# Patient Record
Sex: MALE | Employment: OTHER | URBAN - METROPOLITAN AREA
[De-identification: names, ages, dates, MRNs, and addresses within clinical notes are randomized per-mention and may not be internally consistent; named-entity substitution may affect disease eponyms.]

---

## 2017-01-03 ENCOUNTER — TELEPHONE (OUTPATIENT)
Dept: SURGERY | Age: 67
End: 2017-01-03

## 2017-01-05 DIAGNOSIS — M16.7 OTHER SECONDARY OSTEOARTHRITIS OF RIGHT HIP: Primary | ICD-10-CM

## 2017-01-05 NOTE — TELEPHONE ENCOUNTER
Returned call to wife of patient, informed her of  appointment on 3/24/17 for 9 AM. With someone in Dr Denise Zheng group for osteoarthritis .

## 2017-03-20 ENCOUNTER — TELEPHONE (OUTPATIENT)
Dept: RHEUMATOLOGY | Age: 67
End: 2017-03-20

## 2017-03-24 ENCOUNTER — TELEPHONE (OUTPATIENT)
Dept: RHEUMATOLOGY | Age: 67
End: 2017-03-24

## 2017-03-27 ENCOUNTER — OFFICE VISIT (OUTPATIENT)
Dept: RHEUMATOLOGY | Age: 67
End: 2017-03-27

## 2017-03-27 VITALS
HEART RATE: 65 BPM | DIASTOLIC BLOOD PRESSURE: 73 MMHG | WEIGHT: 252 LBS | RESPIRATION RATE: 18 BRPM | OXYGEN SATURATION: 95 % | HEIGHT: 69 IN | SYSTOLIC BLOOD PRESSURE: 130 MMHG | TEMPERATURE: 96.2 F | BODY MASS INDEX: 37.33 KG/M2

## 2017-03-27 DIAGNOSIS — M17.0 PRIMARY OSTEOARTHRITIS OF BOTH KNEES: ICD-10-CM

## 2017-03-27 DIAGNOSIS — M19.042 PRIMARY OSTEOARTHRITIS OF BOTH HANDS: ICD-10-CM

## 2017-03-27 DIAGNOSIS — M19.041 PRIMARY OSTEOARTHRITIS OF BOTH HANDS: ICD-10-CM

## 2017-03-27 DIAGNOSIS — E66.9 OBESITY (BMI 30-39.9): ICD-10-CM

## 2017-03-27 DIAGNOSIS — M16.11 PRIMARY OSTEOARTHRITIS OF RIGHT HIP: Primary | ICD-10-CM

## 2017-03-27 DIAGNOSIS — M48.10 DISH (DIFFUSE IDIOPATHIC SKELETAL HYPEROSTOSIS): ICD-10-CM

## 2017-03-27 RX ORDER — DEXTROMETHORPHAN HYDROBROMIDE, GUAIFENESIN 5; 100 MG/5ML; MG/5ML
1300 LIQUID ORAL
COMMUNITY

## 2017-03-27 RX ORDER — BISMUTH SUBSALICYLATE 262 MG
1 TABLET,CHEWABLE ORAL DAILY
COMMUNITY

## 2017-03-27 NOTE — MR AVS SNAPSHOT
Visit Information Date & Time Provider Department Dept. Phone Encounter #  
 3/27/2017  9:00 AM Noreen Alves MD [facility] [id number] Upcoming Health Maintenance Date Due Hepatitis C Screening 1950 DTaP/Tdap/Td series (1 - Tdap) 10/13/1970 FOBT Q 1 YEAR AGE 50-75 10/13/1999 ZOSTER VACCINE AGE 60> 10/13/2009 GLAUCOMA SCREENING Q2Y 10/13/2014 Pneumococcal 65+ Low/Medium Risk (1 of 2 - PCV13) 10/13/2014 MEDICARE YEARLY EXAM 10/13/2014 INFLUENZA AGE 9 TO ADULT 8/1/2016 Allergies as of 3/27/2017  Review Complete On: 3/27/2017 By: Noreen Alves MD  
 No Known Allergies Current Immunizations  Never Reviewed No immunizations on file. Not reviewed this visit You Were Diagnosed With   
  
 Codes Comments Primary osteoarthritis of right hip    -  Primary ICD-10-CM: M16.11 
ICD-9-CM: 715.15 Primary osteoarthritis of both knees     ICD-10-CM: M17.0 ICD-9-CM: 715.16 Primary osteoarthritis of both hands     ICD-10-CM: M19.041, I55.698 ICD-9-CM: 715.14 DISH (diffuse idiopathic skeletal hyperostosis)     ICD-10-CM: M48.10 ICD-9-CM: 721.6 Obesity (BMI 30-39. 9)     ICD-10-CM: E66.9 ICD-9-CM: 278.00 Vitals BP Pulse Temp Resp Height(growth percentile) Weight(growth percentile) 130/73 (BP 1 Location: Right arm, BP Patient Position: Sitting) 65 96.2 °F (35.7 °C) 18 5' 9\" (1.753 m) 252 lb (114.3 kg) SpO2 BMI Smoking Status 95% 37.21 kg/m2 Former Smoker BMI and BSA Data Body Mass Index Body Surface Area  
 37.21 kg/m 2 2.36 m 2 Preferred Pharmacy Pharmacy Name Phone [id number] [address]  [phone] Your Updated Medication List  
  
   
This list is accurate as of: 3/27/17  9:32 AM.  Always use your most recent med list.  
  
  
  
  
 ANDROGEL 20.25 mg/1.25 gram (1.62 %) gel Generic drug:  testosterone Apply  to affected area daily. aspirin, buffered 81 mg Tab Take  by mouth. ATROVENT HFA 17 mcg/actuation inhaler Generic drug:  ipratropium Take 1 Puff by inhalation. furosemide 80 mg tablet Commonly known as:  LASIX Take  by mouth daily. METOPROLOL TARTRATE PO Take  by mouth.  
  
 multivitamin tablet Commonly known as:  ONE A DAY Take 1 Tab by mouth daily. Omega-3 Fatty Acids 300 mg Cap Take  by mouth.  
  
 potassium chloride SR 20 mEq tablet Commonly known as:  K-TAB Take  by mouth. TYLENOL ARTHRITIS PAIN 650 mg CR tablet Generic drug:  acetaminophen Take 650 mg by mouth every six (6) hours as needed for Pain.  
  
 warfarin 6 mg tablet Commonly known as:  COUMADIN Take 4 mg by mouth daily. Pt takes 4 mg on Monday and 6mg on TU, Wed, Th, Fri, Sat, Sun. We Performed the Following REFERRAL TO PHYSICAL THERAPY [LDL09 Custom] Comments:  
 Evaluate and treat right hip osteoarthritis and DISH Referral Information Referral ID Referred By Referred To  
  
 [id number] Sofia Troncoso Not Available Visits Status Start Date End Date 1 New Request 3/27/17 3/27/18 If your referral has a status of pending review or denied, additional information will be sent to support the outcome of this decision. Patient Instructions I gave you a referral to physical therapy. HIP OSTEOARTHRITIS. Osteoarthritis is also known as \"wear and tear arthritis,\" mechanical arthritis, or non-inflammatory arthritis. There are hereditary and vocational components and post-traumatic joint injuries may also predispose to it. It is not Rheumatoid Arthritis, however, patients with Rheumatoid Arthritis may also have osteoarthritis.   
 
I recommend maintaining a healthy weight to slow the progression of osteoarthritis in addition to following the [employer] of Rheumatology Osteoarthritis Treatment Guidelines (Girish BUSH, et al. Arthritis Care Res [facility]). 2012;64(4):465):  
 
(1) non-pharmacologic modalities such as aerobic, aquatic, and/or resistance exercises as well as weight loss for overweight patients   
 
(2) pharmacologic modalities, such as acetaminophen as first line (3000 mg maximum per day). (3) tramadol, as third line  
 
(4) intra-articular corticosteroid injections, as fourth line 
 
(5) knee joint replacement surgery MY RECOMMENDATIONS 
 
WEIGHT LOSS 
 
1) I recommend weight loss because every 1 lb of extra weight is approximately 5 lbs of extra weight on the knees. 2) Please count your daily caloric intake and try not to exceed 9405-9206 calories. EXERCISE 
 
1) You should perform at least 30 minutes of physical exercise per day, such as walking, climbing stairs, or swimming. If you have access to a pool, I recommend walking in the pool for at least 30 minutes every day. Introducing [facility]! Kimberlyn Brody introduces Gather patient portal. Now you can access parts of your medical record, email your doctor's office, and request medication refills online. 1. In your internet browser, go to [email or web address] 2. Click on the First Time User? Click Here link in the Sign In box. You will see the New Member Sign Up page. 3. Enter your Gather Access Code exactly as it appears below. You will not need to use this code after youve completed the sign-up process. If you do not sign up before the expiration date, you must request a new code. · Gather Access Code: [email or web address] Expires: 6/25/2017  9:30 AM 
 
4. Enter the last four digits of your Social Security Number (xxxx) and Date of Birth (mm/dd/yyyy) as indicated and click Submit. You will be taken to the next sign-up page. 5. Create a Gather ID.  This will be your Gather login ID and cannot be changed, so think of one that is secure and easy to remember. 6. Create a [employer] password. You can change your password at any time. 7. Enter your Password Reset Question and Answer. This can be used at a later time if you forget your password. 8. Enter your e-mail address. You will receive e-mail notification when new information is available in [address] 9. Click Sign Up. You can now view and download portions of your medical record. 10. Click the Download Summary menu link to download a portable copy of your medical information. If you have questions, please visit the Frequently Asked Questions section of the [employer] website. Remember, [employer] is NOT to be used for urgent needs. For medical emergencies, dial 911. Now available from your iPhone and Android! Please provide this summary of care documentation to your next provider. Your primary care clinician is listed as [address]. If you have any questions after today's visit, please call [phone].

## 2017-03-27 NOTE — PROGRESS NOTES
REASON FOR VISIT    This is the initial evaluation for Mr. Licona a 78 y.o.  male for question of an inflammatory arthritis. The patient is referred to the [facility] and [address] [place] [address] at the request of Dr. Wale Foss. HISTORY OF PRESENT ILLNESS      In 7/2016, while walking on the treadmill, he developed pain in his groin. It feels like a sore lump but when he palpates, there is no lump. It worse going and up stairs or standing for a long time. It waxes and wanes. It had tried Tylenol Arthritis with relief. He does not take NSAIDs due to his heart disease and is on warfarin. It improves with exercise. It tends to be worse by the end of the day. At night, when he rolls over, he develops pain on her left flank. In 11/13/2016, ultrasound scrotum RIGHT TESTICLE: The right testicle is normal in size and echotexture with normal color-flow. The right testis measures 1.6 x 2.5 x 3.0 cm. RIGHT EPIDIDYMIS: The right epididymis shows a 1.6 x 2.0 x 2.2 cm nontender cyst with fine echoes possible spermatocele. Imaging of the right inguinal area without and with the Valsalva maneuver demonstrates no appreciable hernia, adenopathy or fluid collection. LEFT TESTICLE: The left testicle is normal in size and echotexture with normal color-flow. The left testis measures 1.9 x 2.6 x 3.0 cm. LEFT EPIDIDYMIS: The left epididymis shows 2 cysts measuring 0.9 x 0.9 x 1.0 and 0.6 x 0.7 x 0.9 cm. Imaging over the left inguinal area demonstrate no abnormality. In 12/19/2016, CT Abdomen and pelvis with contrast showed there are two stable 5 mm nodules in the lateral aspect of the right middle lobe. Mild pleural thickening is noted in both posterior costophrenic angles, also stable. There are no focal abnormalities within the spleen, pancreas, adrenal glands. Nonobstructing left renal stones measure no greater than 4 mm in size.  Within the left hepatic lobe, there is an 8.4 cm hypodense structure that has a Hounsfield unit measurement of 5, compatible with a simple cyst. Arising from the posterior medial aspect of the left hepatic lobe is a stable 2.8 cm cyst with a Hounsfield unit measurement of 7.5. The aorta tapers without aneurysm. There is no retroperitoneal adenopathy or mass. There is no ascites or free intraperitoneal air. The bowel is normal. The appendix is normal. There is no pelvic mass or adenopathy. A few calcifications are noted in the right prostate. There are multiple corticated cysts in the right acetabulum. Mild DISH is seen. I personally reviewed the images of this study with the patient and his wife. He is a former smoker. Today, he denies pain. He notices his right leg is rotated outside. He is taking Tylenol arthritis twice daily. He has noticed loss of flexibility and diffuse morning stiffness lasting 30 minutes. REVIEW OF SYSTEMS    A 15 point review of systems was performed and summarized below. The questionnaire was reviewed with the patient and scanned into the patient's medical record.     General: endorses recent 6 lbs weight gain, denies recent weight loss, fatigue, weakness, fever, night sweats  Musculoskeletal: endorses morning stiffness (lasting 30 minutes), joint pain, joint swelling, denies muscle weakness  Ears: endorses ringing in ears, loss of hearing  Eyes: denies pain, redness, loss of vision, double vision, blurred vision, dryness, foreign body sensation  Mouth: denies sore tongue, oral ulcers, bleeding gums, loss of taste, dryness, increased dental caries  Nose: denies nosebleeds, loss of smell, nasal ulcers  Throat: denies frequent sore throats, hoarseness, difficulty in swallowing, pain in jaw while chewing  Neck: denies swollen glands, tender glands  Cardiopulmonary: endorses irregular heart beat, swollen legs or feet, denies pain in chest, sudden changes in heart beat, shortness of breath, difficulty breathing at night, cough, coughing of blood, wheezing  Gastrointestinal: denies nausea, heartburn, stomach pain relieved by food, vomiting of blood/\"coffee grounds\", jaundice, increasing constipation, persistent diarrhea, blood in stools, black stools  Genitourinary: denies difficult urination, pain or burning on urination, blood in urine, cloudy urine, pus in urine, genital discharge, frequent urination, getting up at night to pass urine, rash/ulcers, sexual difficulties, prostate trouble  Hematologic: denies anemia, bleeding tendency, blood clots  Skin: endorses easy bruising, redness, nodules/bumps, hair loss, denies rash, hives, sun sensitive, skin tightness, color changes of hands or feet in the cold (Raynaud's)  Neurologic: denies headaches, dizziness, numbness or tingling in hands/feet, memory loss, muscle weakness, fainting of loss of consciousness  Psychiatric: denies depression, excessive worries  Sleep: endorses obstructive sleep apnea on CPAP, denies poor sleep (6 hours), denies snoring, daytime somnolence, difficulty falling asleep, difficulty staying asleep     PAST MEDICAL HISTORY    He has a past medical history of CAD (coronary artery disease); CAD (coronary artery disease); Congestive heart failure ([facility] [address] Low testosterone; and Sleep apnea. FAMILY HISTORY    His family history includes Breast Cancer in his mother; Diabetes in his brother and mother; Heart Disease in his father. SOCIAL HISTORY    He reports that he has quit smoking. He does not have any smokeless tobacco history on file. He reports that he does not drink alcohol. HEALTH MAINTENANCE    Immunizations    There is no immunization history on file for this patient. Age Appropriate Cancer Screening    Colonoscopy: 2009    MEDICATIONS    Current Outpatient Prescriptions   Medication Sig Dispense Refill    Omega-3 Fatty Acids 300 mg cap Take  by mouth.  multivitamin (ONE A DAY) tablet Take 1 Tab by mouth daily.       acetaminophen (TYLENOL ARTHRITIS PAIN) 650 mg CR tablet Take 650 mg by mouth every six (6) hours as needed for Pain.  ipratropium (ATROVENT HFA) 17 mcg/actuation inhaler Take 1 Puff by inhalation.  warfarin (COUMADIN) 6 mg tablet Take 4 mg by mouth daily. Pt takes 4 mg on Monday and 6mg on TU, Wed, Th, Fri, Sat, Sun.      Aspirin, Buffered 81 mg tab Take  by mouth.  testosterone (ANDROGEL) 20.25 mg/1.25 gram (1.62 %) gel Apply  to affected area daily.  METOPROLOL TARTRATE PO Take  by mouth.  furosemide (LASIX) 80 mg tablet Take  by mouth daily.  potassium chloride 20 mEq TbER Take  by mouth. ALLERGIES    No Known Allergies    PHYSICAL EXAMINATION    Visit Vitals    /73 (BP 1 Location: Right arm, BP Patient Position: Sitting)    Pulse 65    Temp 96.2 °F (35.7 °C)    Resp 18    Ht 5' 9\" (1.753 m)    Wt 252 lb (114.3 kg)    SpO2 95%    BMI 37.21 kg/m2     Body mass index is 37.21 kg/(m^2). General: Patient is alert, oriented x 3, not in acute distress, wife at bedside    HEENT:   Conjunctiva are not injected and appear moist, oral mucous membranes are moist, there are no ulcers present, there is no alopecia, neck is supple, there is no lymphadenopathy. Salivary glands are normal    Cardiovascular:  Heart is regular rate and rhythm, no murmurs. Chest:  Lungs are clear to auscultation bilaterally. Abdomen:  Obese. Extremities:  Free of clubbing, cyanosis, edema, extremities well perfused. Neurological exam:  No focal sensory deficits, muscle strength is full in upper and lower extremities. Skin exam:  There are no rashes, no tophi, no psoriasis, no active Raynaud's, no livedo reticularis, no periungual erythema. Musculoskeletal exam:  A comprehensive musculoskeletal exam was performed for all joints of each upper and lower extremity and assessed for swelling, tenderness and range of motion. Pertinent results are documented as below:    Bilateral Sybil and Heberden nodes. Bilateral knee crepitus without effusion.   Right hip external rotation without limitation in ROM  Internal rotation of right hip causes trochanter bursa pain  No synovitis. DATA REVIEW    Prior medical records were reviewed and are summarized as below:    Laboratory data: none recent    Imaging: summarized in the HPI. I personally reviewed the images of this study. ASSESSMENT AND PLAN    1) Right Hip Osteoarthritis. This was seen on imaging in 12/2016. I gave him a referral to physical therapy and encouraged weight loss. He may continue to use Tylenol as needed up to 3000 mg per day. 2) Bilateral Knee Osteoarthritis. The patient has osteoarthritis, which is also known as \"wear and tear arthritis,\" non-inflammatory arthritis or mechanical arthritis. There are hereditary, vocational and posttraumatic joint injuries predisposing factors. I recommend maintaining a healthy weight to slow the progression of osteoarthritis in addition to following the [employer] of Rheumatology Osteoarthritis Treatment Guidelines: (1) non-pharmacologic modalities such as aerobic, aquatic, and/or resistance exercises as well as weight loss for overweight patients; in addition to (2) pharmacologic modalities such as acetaminophen as first line. He is on warfarin and therefore NSAIDs are contra-indicated. I recommend weight loss because every 1 lb of extra weight is approximately 5 lbs of extra weight on the knees. I asked the patient to count their daily caloric intake and try not to exceed 7754-4026 calories. I asked the patient to perform at least 30 minutes of physical exercise per day, such as walking, climbing stairs, or swimming. If they have access to a pool, I recommend walking in the pool for at least 30 minutes every day. Performing at least 8 weeks of yoga (two 60-minute classes and 1 home practice per week) was shown to help individuals with arthritis safely increase physical activity, and improve physical and psychological health [facility] et al. Alma Anderson Rheumatol. 2015 Jul;42(3):1194-202). 3) Bilateral Hand Osteoarthritis. The patient has osteoarthritis, which is also known as \"wear and tear arthritis,\" non-inflammatory arthritis or mechanical arthritis. There are hereditary, vocational and posttraumatic joint injuries predisposing factors. I recommend non-pharmacologic modalities such as keeping hands warm, avoid activities that case pain, warm water soaks, in addition to (2) pharmacologic modalities such as acetaminophen as first line. He is on warfarin and therefore NSAIDs are contra-indicated. 4) Diffuse Idiopathic Skeletal Hyperostosis. This is a benign disorder that results with ankylosis on the spine. It is not an inflammatory process and is not ankylosing spondylitis. 5) Obesity. His BMI was 37.21. Weight loss was recommended. I saw the patient with Joy Skiff, [place] fellow who is shadowing. I personally performed the history and physical examination, and discussed my assessment and the plan of the care with the patient. The patient voiced understanding of the aforementioned assessment and plan. Summary of plan was provided in the After Visit Summary patient instructions. I also provided education about MyChart setup and utility. TODAY'S ORDERS    Orders Placed This Encounter    REFERRAL TO PHYSICAL THERAPY     No future appointments.     Prisca Gómez MD, [address] [facility]    Adult Rheumatology   Musculoskeletal Ultrasound Certified  [address] [place], [place] [address]kate Douglas   [id number] [place] [address]   Phone [phone]  Fax [phone]

## 2017-03-27 NOTE — PATIENT INSTRUCTIONS
I gave you a referral to physical therapy. HIP OSTEOARTHRITIS. Osteoarthritis is also known as \"wear and tear arthritis,\" mechanical arthritis, or non-inflammatory arthritis. There are hereditary and vocational components and post-traumatic joint injuries may also predispose to it. It is not Rheumatoid Arthritis, however, patients with Rheumatoid Arthritis may also have osteoarthritis. I recommend maintaining a healthy weight to slow the progression of osteoarthritis in addition to following the [employer] of Rheumatology Osteoarthritis Treatment Guidelines (Girish MC, et al. Arthritis Care Res [facility]). 2012;64(4):465):     (1) non-pharmacologic modalities such as aerobic, aquatic, and/or resistance exercises as well as weight loss for overweight patients      (2) pharmacologic modalities, such as acetaminophen as first line (3000 mg maximum per day). (3) tramadol, as third line     (4) intra-articular corticosteroid injections, as fourth line    (5) knee joint replacement surgery      MY RECOMMENDATIONS    WEIGHT LOSS    1) I recommend weight loss because every 1 lb of extra weight is approximately 5 lbs distally     2) Please count your daily caloric intake and try not to exceed 1559-3450 calories. EXERCISE    1) You should perform at least 30 minutes of physical exercise per day, such as walking, climbing stairs, or swimming. If you have access to a pool, I recommend walking in the pool for at least 30 minutes every day.

## 2019-06-17 RX ORDER — FLUOROURACIL 50 MG/G
1 CREAM TOPICAL
COMMUNITY

## 2019-06-17 RX ORDER — ENOXAPARIN SODIUM 100 MG/ML
80 INJECTION SUBCUTANEOUS EVERY 12 HOURS
COMMUNITY

## 2019-06-18 ENCOUNTER — ANESTHESIA (OUTPATIENT)
Dept: ENDOSCOPY | Age: 69
End: 2019-06-18
Payer: MEDICARE

## 2019-06-18 ENCOUNTER — HOSPITAL ENCOUNTER (OUTPATIENT)
Age: 69
Setting detail: OUTPATIENT SURGERY
Discharge: HOME | End: 2019-06-18
Attending: INTERNAL MEDICINE | Admitting: INTERNAL MEDICINE
Payer: MEDICARE

## 2019-06-18 ENCOUNTER — ANESTHESIA EVENT (OUTPATIENT)
Dept: ENDOSCOPY | Age: 69
End: 2019-06-18
Payer: MEDICARE

## 2019-06-18 VITALS
OXYGEN SATURATION: 95 % | HEIGHT: 69 IN | BODY MASS INDEX: 35.58 KG/M2 | SYSTOLIC BLOOD PRESSURE: 142 MMHG | HEART RATE: 83 BPM | TEMPERATURE: 98.2 F | WEIGHT: 240.19 LBS | DIASTOLIC BLOOD PRESSURE: 72 MMHG | RESPIRATION RATE: 13 BRPM

## 2019-06-18 PROCEDURE — 77030013992 HC SNR POLYP ENDOSC BSC -B: Performed by: INTERNAL MEDICINE

## 2019-06-18 PROCEDURE — 74011250636 HC RX REV CODE- 250/636

## 2019-06-18 PROCEDURE — 74011250636 HC RX REV CODE- 250/636: Performed by: INTERNAL MEDICINE

## 2019-06-18 PROCEDURE — 76060000031 HC ANESTHESIA FIRST 0.5 HR: Performed by: INTERNAL MEDICINE

## 2019-06-18 PROCEDURE — 88305 TISSUE EXAM BY PATHOLOGIST: CPT

## 2019-06-18 PROCEDURE — 76040000019: Performed by: INTERNAL MEDICINE

## 2019-06-18 RX ORDER — SODIUM CHLORIDE 0.9 % (FLUSH) 0.9 %
5-40 SYRINGE (ML) INJECTION AS NEEDED
Status: DISCONTINUED | OUTPATIENT
Start: 2019-06-18 | End: 2019-06-18 | Stop reason: HOSPADM

## 2019-06-18 RX ORDER — LIDOCAINE HYDROCHLORIDE 20 MG/ML
INJECTION, SOLUTION EPIDURAL; INFILTRATION; INTRACAUDAL; PERINEURAL AS NEEDED
Status: DISCONTINUED | OUTPATIENT
Start: 2019-06-18 | End: 2019-06-18 | Stop reason: HOSPADM

## 2019-06-18 RX ORDER — NALOXONE HYDROCHLORIDE 0.4 MG/ML
0.4 INJECTION, SOLUTION INTRAMUSCULAR; INTRAVENOUS; SUBCUTANEOUS
Status: DISCONTINUED | OUTPATIENT
Start: 2019-06-18 | End: 2019-06-18 | Stop reason: HOSPADM

## 2019-06-18 RX ORDER — DEXTROMETHORPHAN/PSEUDOEPHED 2.5-7.5/.8
1.2 DROPS ORAL
Status: DISCONTINUED | OUTPATIENT
Start: 2019-06-18 | End: 2019-06-18 | Stop reason: HOSPADM

## 2019-06-18 RX ORDER — MIDAZOLAM HYDROCHLORIDE 1 MG/ML
.25-5 INJECTION, SOLUTION INTRAMUSCULAR; INTRAVENOUS
Status: DISCONTINUED | OUTPATIENT
Start: 2019-06-18 | End: 2019-06-18 | Stop reason: HOSPADM

## 2019-06-18 RX ORDER — ATROPINE SULFATE 0.1 MG/ML
0.5 INJECTION INTRAVENOUS
Status: DISCONTINUED | OUTPATIENT
Start: 2019-06-18 | End: 2019-06-18 | Stop reason: HOSPADM

## 2019-06-18 RX ORDER — SODIUM CHLORIDE 0.9 % (FLUSH) 0.9 %
5-40 SYRINGE (ML) INJECTION EVERY 8 HOURS
Status: DISCONTINUED | OUTPATIENT
Start: 2019-06-18 | End: 2019-06-18 | Stop reason: HOSPADM

## 2019-06-18 RX ORDER — SODIUM CHLORIDE 9 MG/ML
75 INJECTION, SOLUTION INTRAVENOUS CONTINUOUS
Status: DISCONTINUED | OUTPATIENT
Start: 2019-06-18 | End: 2019-06-18 | Stop reason: HOSPADM

## 2019-06-18 RX ORDER — DIPHENHYDRAMINE HCL 25 MG
50 TABLET ORAL
COMMUNITY

## 2019-06-18 RX ORDER — FLUMAZENIL 0.1 MG/ML
0.2 INJECTION INTRAVENOUS
Status: DISCONTINUED | OUTPATIENT
Start: 2019-06-18 | End: 2019-06-18 | Stop reason: HOSPADM

## 2019-06-18 RX ORDER — PROPOFOL 10 MG/ML
INJECTION, EMULSION INTRAVENOUS AS NEEDED
Status: DISCONTINUED | OUTPATIENT
Start: 2019-06-18 | End: 2019-06-18 | Stop reason: HOSPADM

## 2019-06-18 RX ORDER — EPINEPHRINE 0.1 MG/ML
1 INJECTION INTRACARDIAC; INTRAVENOUS
Status: DISCONTINUED | OUTPATIENT
Start: 2019-06-18 | End: 2019-06-18 | Stop reason: HOSPADM

## 2019-06-18 RX ADMIN — SODIUM CHLORIDE 75 ML/HR: 900 INJECTION, SOLUTION INTRAVENOUS at 09:36

## 2019-06-18 RX ADMIN — LIDOCAINE HYDROCHLORIDE 40 MG: 20 INJECTION, SOLUTION EPIDURAL; INFILTRATION; INTRACAUDAL; PERINEURAL at 09:40

## 2019-06-18 RX ADMIN — PROPOFOL 150 MG: 10 INJECTION, EMULSION INTRAVENOUS at 09:59

## 2019-06-18 NOTE — ANESTHESIA POSTPROCEDURE EVALUATION
Procedure(s):  COLONOSCOPY  ENDOSCOPIC POLYPECTOMY. total IV anesthesia    Anesthesia Post Evaluation        Patient location during evaluation: PACU  Note status: Adequate. Level of consciousness: responsive to verbal stimuli and sleepy but conscious  Pain management: satisfactory to patient  Airway patency: patent  Anesthetic complications: no  Cardiovascular status: acceptable  Respiratory status: acceptable  Hydration status: acceptable  Comments: +Post-Anesthesia Evaluation and Assessment    Patient: ePter Licona MRN: [id number]  SSN: [id number]   YOB: 1950  Age: 70 y.o. Sex: male      Cardiovascular Function/Vital Signs    /69   Pulse 77   Temp 36.4 °C (97.5 °F)   Resp 16   Ht 5' 9\" (1.753 m)   Wt 108.9 kg (240 lb 3 oz)   SpO2 97%   BMI 35.47 kg/m²     Patient is status post Procedure(s):  COLONOSCOPY  ENDOSCOPIC POLYPECTOMY. Nausea/Vomiting: Controlled. Postoperative hydration reviewed and adequate. Pain:  Pain Scale 1: Numeric (0 - 10) (06/18/19 0926)  Pain Intensity 1: 0 (06/18/19 0926)   Managed. Neurological Status: At baseline. Mental Status and Level of Consciousness: Arousable. Pulmonary Status:   O2 Device: CO2 nasal cannula (06/18/19 1002)   Adequate oxygenation and airway patent. Complications related to anesthesia: None    Post-anesthesia assessment completed. No concerns.     Signed By: Ba Childers MD    6/18/2019  Post anesthesia nausea and vomiting:  controlled      Vitals Value Taken Time   /69 6/18/2019 10:02 AM   Temp     Pulse 77 6/18/2019 10:02 AM   Resp 16 6/18/2019 10:02 AM   SpO2 97 % 6/18/2019 10:02 AM

## 2019-06-18 NOTE — DISCHARGE INSTRUCTIONS
[place] Office: [phone]    [address]  [id number]  1950    EGD/COLONOSCOPY DISCHARGE INSTRUCTIONS  Discomfort:  redness at IV site- apply warm compress to area; if redness or soreness persist- contact your physician  Gaseous discomfort- walking, belching will help relieve any discomfort  You may not operate a vehicle for 12 hours  You may not engage in an occupation involving machinery or appliances for rest of today. You may not drink alcoholic beverages for at least 12 hours  Avoid making any critical decisions for at least 24 hour  DIET  You may resume your regular diet - however -  remember your colon is empty and a heavy meal will produce gas. Avoid these foods:  fried / greasy foods, excessive carbonated drinks or too much caffeine  MEDICATIONS   Regarding Aspirin or Nonsteroidal medications specifically, please see below. ACTIVITY  You may resume your normal daily activities. Spend the remainder of the day resting -  avoid any strenuous activity. CALL M.D. ANY SIGN OF   Increasing pain, nausea, vomiting  Abdominal distension (swelling)  New increased bleeding (oral or rectal)  Fever (chills)    You may not take any Advil, Aspirin, Ibuprofen, Motrin, Aleve, or Goodys for 5 days, ONLY  Tylenol as needed for pain. Start Coumadin today. Follow-up Instructions:   Call  Jan Smith MD for any questions or concerns  Results of procedure / biopsy in 7 days   Telephone # [phone]      Follow-up Information    None

## 2019-06-18 NOTE — PROGRESS NOTES
Anesthesia reports 150 mg Propofol, 40 mg Lidocaine and 350 mL NS given during procedure. Received report from anesthesia staff on vital signs and status of patient.

## 2019-06-18 NOTE — PROCEDURES
Colonoscopy Procedure Note    Morgan iLcona  1950  [id number]    Indications:  Please see below. Pre-operative Diagnosis: SCREENING COLONOSCOPY    Post-operative Diagnosis: Diverticulosis, Colon Polyps,internal hemorrhoids      : Jan Garsia MD    Referring Provider: Nuria Shannon MD    Sedation:  MAC anesthesia Propofol        Procedure Details:    After detailed informed consent was obtained with all risks and benefits of procedure explained and preoperative exam completed, the patient was taken to the endoscopy suite and placed in the left lateral decubitus position. Upon sequential sedation as per above, a digital rectal exam was performed  And was normal.  The Olympus videocolonoscope  was inserted in the rectum and carefully advanced to the cecum, which was identified by the ileocecal valve. The quality of preparation was fair but unsatisfactory for flat/small lesions. The colonoscope was slowly withdrawn with careful evaluation between folds. Retroflexion in the rectum was performed. Findings:   · Colon prep is fair mixed with liquid stool in the colon. Flat/sessile lesions can be missed as a result. · A 8 mm sessile ascending colon, 2 transverse colon(8 mm) and 3 polyps (5-8 mm) in the sigmoid colon are noted; all polyps were removed with a hot snare and sent off in separate jars. · Moderate sigmoid diverticulosis  · Medium internal hemorrhoids          Therapies:  6 complete polypectomy were performed using hot snare  and the polyps were  retrieved    Specimen:  - Specimens were collected as described above and sent to pathology. Complications: None were encountered during the procedure. EBL:  None. Recommendations:     -Await pathology. -Repeat colonoscopy in 1 year with better prep.    -Naturally, for new bleeding, unexplained weight loss,change in bowel habits and anemia, an earlier colonoscopy should be considered. Jan Garsia MD  6/18/2019  9:58 AM

## 2019-06-18 NOTE — H&P
Pre-endoscopy H and P    The patient was seen and examined in the room/pre-op holding area. The airway was assessed and documented. The problem list, past medical history, and medications were reviewed. Patient Active Problem List   Diagnosis Code    Primary osteoarthritis of right hip M16.11    Primary osteoarthritis of both knees M17.0    Primary osteoarthritis of both hands M19.041, M19.042    DISH (diffuse idiopathic skeletal hyperostosis) M48.10    Obesity (BMI 30-39. 9) E66.9     Social History     Socioeconomic History    Marital status:      Spouse name: Not on file    Number of children: Not on file    Years of education: Not on file    Highest education level: Not on file   Occupational History    Not on file   Social Needs    Financial resource strain: Not on file    Food insecurity:     Worry: Not on file     Inability: Not on file    Transportation needs:     Medical: Not on file     Non-medical: Not on file   Tobacco Use    Smoking status: Former Smoker     Last attempt to quit: 1990     Years since quittin.1    Smokeless tobacco: Never Used   Substance and Sexual Activity    Alcohol use: No    Drug use: Not on file    Sexual activity: Not on file   Lifestyle    Physical activity:     Days per week: Not on file     Minutes per session: Not on file    Stress: Not on file   Relationships    Social connections:     Talks on phone: Not on file     Gets together: Not on file     Attends Taoist service: Not on file     Active member of club or organization: Not on file     Attends meetings of clubs or organizations: Not on file     Relationship status: Not on file    Intimate partner violence:     Fear of current or ex partner: Not on file     Emotionally abused: Not on file     Physically abused: Not on file     Forced sexual activity: Not on file   Other Topics Concern    Not on file   Social History Narrative    Not on file     Past Medical History: Diagnosis Date    CAD (coronary artery disease)     repaired hole in heart as a child    CAD (coronary artery disease)     Kunal Bucio MD    Congestive heart failure (HCC)     Low testosterone     Sleep apnea     uses CPAP         Prior to Admission Medications   Prescriptions Last Dose Informant Patient Reported? Taking? Aspirin, Buffered 81 mg tab 2019  Yes No   Sig: Take  by mouth daily. METOPROLOL TARTRATE PO 2019 at Unknown time  Yes Yes   Sig: Take 25 mg by mouth two (2) times a day. Omega-3 Fatty Acids 300 mg cap 2019 at Unknown time  Yes Yes   Sig: Take  by mouth daily. acetaminophen (TYLENOL ARTHRITIS PAIN) 650 mg CR tablet Unknown at Unknown time  Yes No   Sig: Take 650 mg by mouth every six (6) hours as needed for Pain. diphenhydrAMINE (BENADRYL ALLERGY) 25 mg tablet 2019 at Unknown time  Yes Yes   Sig: Take 50 mg by mouth every six (6) hours as needed for Sleep.   enoxaparin (LOVENOX) 80 mg/0.8 mL injection 2019  Yes Yes   Si mg by SubCUTAneous route every twelve (12) hours. fluorouracil (EFUDEX) 5 % chemo cream 2019 at Unknown time  Yes Yes   Sig: Apply  to affected area every Monday, Thursday, Saturday. furosemide (LASIX) 80 mg tablet 2019 at Unknown time  Yes Yes   Sig: Take  by mouth daily. multivitamin (ONE A DAY) tablet 2019 at Unknown time  Yes Yes   Sig: Take 1 Tab by mouth daily. potassium chloride 20 mEq TbER 2019 at Unknown time  Yes Yes   Sig: Take  by mouth daily. testosterone (ANDROGEL) 20.25 mg/1.25 gram (1.62 %) gel 2019 at Unknown time  Yes Yes   Sig: Apply  to affected area daily. warfarin (COUMADIN) 6 mg tablet 2019  Yes No   Sig: Take 6 mg by mouth daily.  Pt takes 6 mg every day except for Monday he takes 3mg      Facility-Administered Medications: None           The review of systems is:  Negative  for shortness of breath or chest pain      The heart, lungs, and mental status were satisfactory for the administration of deep sedation and for the procedure. I discussed with the patient the objectives, risks, consequences and alternatives to the procedure.       Ana Jain MD  6/18/2019  9:39 AM

## 2019-06-18 NOTE — ANESTHESIA PREPROCEDURE EVALUATION
Relevant Problems   No relevant active problems       Anesthetic History               Review of Systems / Medical History  Patient summary reviewed, nursing notes reviewed and pertinent labs reviewed    Pulmonary        Sleep apnea: CPAP  Smoker      Comments: Former Smoker   Neuro/Psych   Within defined limits           Cardiovascular          CHF    Pacemaker and CAD    Exercise tolerance: >4 METS  Comments: Hx pacemaker/defibrillator  HX AORTIC VALVE REPLACEMENT      GI/Hepatic/Renal  Within defined limits              Endo/Other        Obesity and arthritis     Other Findings            Physical Exam    Airway  Mallampati: III    Neck ROM: normal range of motion   Mouth opening: Normal     Cardiovascular  Regular rate and rhythm,  S1 and S2 normal,  no murmur, click, rub, or gallop             Dental  No notable dental hx       Pulmonary  Breath sounds clear to auscultation               Abdominal  GI exam deferred       Other Findings            Anesthetic Plan    ASA: 3  Anesthesia type: total IV anesthesia          Induction: Intravenous  Anesthetic plan and risks discussed with: Patient

## 2019-06-18 NOTE — ROUTINE PROCESS
Collettescarlett Nogueira Blunt 1950 
[id number] Situation: 
Verbal report received from: Mariama Patricio Procedure: Procedure(s): 
COLONOSCOPY 
ENDOSCOPIC POLYPECTOMY Background: 
 
Preoperative diagnosis: SCREENING COLONOSCOPY Postoperative diagnosis: Diverticulosis, Colon Polyps, Hemorrhoids :  Dr. Milly Kohler 
Assistant(s): Endoscopy Technician-1: Luis Doyle Endoscopy RN-1: Adrienne Palomares RN Specimens:  
ID Type Source Tests Collected by Time Destination 1 : Ascending Colon Polpy Preservative Colon, Ascending  Hazel Kent MD 6/18/2019 5788 Pathology 2 : Transverse Colon Polyps Preservative Colon, Transverse  Hazel Kent MD 6/18/2019 7068 Pathology 3 : Sigmoid Colon Polyps Preservative Sigmoid  Hazel Kent MD 6/18/2019 4779 Pathology H. Pylori  no Assessment: 
Intra-procedure medications Anesthesia gave intra-procedure sedation and medications, see anesthesia flow sheet yes Intravenous fluids[facility] Vital signs stable Abdominal assessment: round and soft Recommendation: 
Discharge patient per MD order. Family or Amber Mckinnon, wife Permission to share finding with family or friend yes

## 2020-11-09 NOTE — PERIOP NOTES
[facility]  Ambulatory Surgery Unit  Pre-operative Instructions for Endo Procedures    Procedure Date  11/16            Tentative Arrival Time 0615      1. On the day of your procedure, please report to the Ambulatory Surgery Unit Registration Desk and sign in at your designated time. The Ambulatory Surgery Unit is located in [facility] on the [facility] side of the [facility] across from the [address] [place] [address]. Please have all of your health insurance cards and a photo ID. 2. You must have someone with you to drive you home, as you should not drive a car for 24 hours following anesthesia. Please make arrangements for a responsible adult friend or family member to stay with you for at least the first 24 hours after your procedure. 3. Do not have anything to eat or drink (including water, gum, mints, coffee, juice) after 11:59 PM 11/15. This may not apply to medications prescribed by your physician. (Please note below the special instructions with medications to take the morning of your procedure.)    4. If applicable, follow the clear liquid diet and bowel prep instructions provided by your physician's office. If you do not have this information, or have any questions, please contact your physician's office. 5. We recommend you do not drink any alcoholic beverages for 24 hours before and after your procedure. 6. Contact your surgeons office for instructions on the following medications: non-steroidal anti-inflammatory drugs (i.e. Advil, Aleve), vitamins, and supplements. (Some surgeons will want you to stop these medications prior to surgery and others may allow you to take them)   **If you are currently taking Plavix, Coumadin, Aspirin and/or other blood-thinning agents, contact your surgeon for instructions. ** Your surgeon will partner with the physician prescribing these medications to determine if it is safe to stop or if you need to continue taking.  Please do not stop taking these medications without instructions from your surgeon. 7. In an effort to help prevent surgical site infection, we ask that you shower with an anti-bacterial soap (i.e. Dial or Safeguard) on the morning of your procedure. Do not apply any lotions, powders, or deodorants after showering. 8. Wear comfortable clothes. Wear glasses instead of contacts. Do not bring any jewelry or money (other than copays or fees as instructed). Do not wear make-up, particularly mascara, the morning of your procedure. Wear your hair loose or down, no ponytails, buns, germán pins or clips. All body piercings must be removed. 9. You should understand that if you do not follow these instructions your procedure may be cancelled. If your physical condition changes (i.e. fever, cold or flu) please contact your surgeon as soon as possible. 10. It is important that you be on time. If a situation occurs where you may be late, or if you have any questions or problems, please call [phone]. 11. Your procedure time may be subject to change. You will receive a phone call the day prior to confirm your arrival time. Special Instructions: Take all medications and inhalers, as prescribed, on the morning of surgery with a sip of water EXCEPT: n/a      Insulin Dependent Diabetic patients: Take your diabetic medications as prescribed the day before surgery. Hold all diabetic medications the day of surgery. If you are scheduled to arrive for surgery after 8:00 AM, and your AM blood sugar is >200, please call Ambulatory Surgery. I understand a pre-operative phone call will be made to verify my procedure time. In the event that I am not available, I give permission for a message to be left on my answering service and/or with another person?       yes         ___________________      ___________________      ___________________  (Signature of Patient)          (Witness)                   (Date and Time)

## 2020-11-11 ENCOUNTER — ANESTHESIA EVENT (OUTPATIENT)
Dept: SURGERY | Age: 70
End: 2020-11-11
Payer: MEDICARE

## 2020-11-12 ENCOUNTER — HOSPITAL ENCOUNTER (OUTPATIENT)
Dept: PREADMISSION TESTING | Age: 70
Discharge: HOME | End: 2020-11-12
Payer: MEDICARE

## 2020-11-12 PROCEDURE — 87635 SARS-COV-2 COVID-19 AMP PRB: CPT

## 2020-11-13 LAB
HEALTH STATUS, XMCV2T: NORMAL
SARS-COV-2, COV2NT: NOT DETECTED
SOURCE, COVRS: NORMAL
SPECIMEN SOURCE, FCOV2M: NORMAL
SPECIMEN TYPE, XMCV1T: NORMAL

## 2020-11-16 ENCOUNTER — ANESTHESIA (OUTPATIENT)
Dept: SURGERY | Age: 70
End: 2020-11-16
Payer: MEDICARE

## 2020-11-16 ENCOUNTER — HOSPITAL ENCOUNTER (OUTPATIENT)
Age: 70
Setting detail: OUTPATIENT SURGERY
Discharge: HOME | End: 2020-11-16
Attending: INTERNAL MEDICINE | Admitting: INTERNAL MEDICINE
Payer: MEDICARE

## 2020-11-16 VITALS
RESPIRATION RATE: 18 BRPM | TEMPERATURE: 97.6 F | HEIGHT: 69 IN | WEIGHT: 248.6 LBS | DIASTOLIC BLOOD PRESSURE: 73 MMHG | BODY MASS INDEX: 36.82 KG/M2 | OXYGEN SATURATION: 100 % | HEART RATE: 74 BPM | SYSTOLIC BLOOD PRESSURE: 123 MMHG

## 2020-11-16 PROCEDURE — 74011250636 HC RX REV CODE- 250/636: Performed by: ANESTHESIOLOGY

## 2020-11-16 PROCEDURE — 2709999900 HC NON-CHARGEABLE SUPPLY: Performed by: INTERNAL MEDICINE

## 2020-11-16 PROCEDURE — 88305 TISSUE EXAM BY PATHOLOGIST: CPT

## 2020-11-16 PROCEDURE — 77030021352 HC CBL LD SYS DISP COVD -B: Performed by: INTERNAL MEDICINE

## 2020-11-16 PROCEDURE — 76060000073 HC AMB SURG ANES FIRST 0.5 HR: Performed by: INTERNAL MEDICINE

## 2020-11-16 PROCEDURE — 76210000050 HC AMBSU PH II REC 0.5 TO 1 HR: Performed by: INTERNAL MEDICINE

## 2020-11-16 PROCEDURE — 74011250636 HC RX REV CODE- 250/636: Performed by: NURSE ANESTHETIST, CERTIFIED REGISTERED

## 2020-11-16 PROCEDURE — 74011000250 HC RX REV CODE- 250: Performed by: NURSE ANESTHETIST, CERTIFIED REGISTERED

## 2020-11-16 PROCEDURE — 77030013992 HC SNR POLYP ENDOSC BSC -B: Performed by: INTERNAL MEDICINE

## 2020-11-16 PROCEDURE — 76030000002 HC AMB SURG OR TIME FIRST 0.: Performed by: INTERNAL MEDICINE

## 2020-11-16 RX ORDER — OXYCODONE AND ACETAMINOPHEN 5; 325 MG/1; MG/1
1 TABLET ORAL
Status: DISCONTINUED | OUTPATIENT
Start: 2020-11-16 | End: 2020-11-16 | Stop reason: HOSPADM

## 2020-11-16 RX ORDER — FLUMAZENIL 0.1 MG/ML
0.2 INJECTION INTRAVENOUS
Status: DISCONTINUED | OUTPATIENT
Start: 2020-11-16 | End: 2020-11-16 | Stop reason: HOSPADM

## 2020-11-16 RX ORDER — SODIUM CHLORIDE, SODIUM LACTATE, POTASSIUM CHLORIDE, CALCIUM CHLORIDE 600; 310; 30; 20 MG/100ML; MG/100ML; MG/100ML; MG/100ML
25 INJECTION, SOLUTION INTRAVENOUS CONTINUOUS
Status: DISCONTINUED | OUTPATIENT
Start: 2020-11-16 | End: 2020-11-16 | Stop reason: HOSPADM

## 2020-11-16 RX ORDER — LIDOCAINE HYDROCHLORIDE 10 MG/ML
0.1 INJECTION, SOLUTION EPIDURAL; INFILTRATION; INTRACAUDAL; PERINEURAL AS NEEDED
Status: DISCONTINUED | OUTPATIENT
Start: 2020-11-16 | End: 2020-11-16 | Stop reason: HOSPADM

## 2020-11-16 RX ORDER — SODIUM CHLORIDE 0.9 % (FLUSH) 0.9 %
5-40 SYRINGE (ML) INJECTION EVERY 8 HOURS
Status: DISCONTINUED | OUTPATIENT
Start: 2020-11-16 | End: 2020-11-16 | Stop reason: HOSPADM

## 2020-11-16 RX ORDER — DEXTROMETHORPHAN/PSEUDOEPHED 2.5-7.5/.8
1.2 DROPS ORAL
Status: DISCONTINUED | OUTPATIENT
Start: 2020-11-16 | End: 2020-11-16 | Stop reason: HOSPADM

## 2020-11-16 RX ORDER — SODIUM CHLORIDE 0.9 % (FLUSH) 0.9 %
5-40 SYRINGE (ML) INJECTION AS NEEDED
Status: DISCONTINUED | OUTPATIENT
Start: 2020-11-16 | End: 2020-11-16 | Stop reason: HOSPADM

## 2020-11-16 RX ORDER — SODIUM CHLORIDE 9 MG/ML
75 INJECTION, SOLUTION INTRAVENOUS CONTINUOUS
Status: DISCONTINUED | OUTPATIENT
Start: 2020-11-16 | End: 2020-11-16 | Stop reason: HOSPADM

## 2020-11-16 RX ORDER — MORPHINE SULFATE 10 MG/ML
2 INJECTION, SOLUTION INTRAMUSCULAR; INTRAVENOUS
Status: DISCONTINUED | OUTPATIENT
Start: 2020-11-16 | End: 2020-11-16 | Stop reason: HOSPADM

## 2020-11-16 RX ORDER — FENTANYL CITRATE 50 UG/ML
25 INJECTION, SOLUTION INTRAMUSCULAR; INTRAVENOUS
Status: DISCONTINUED | OUTPATIENT
Start: 2020-11-16 | End: 2020-11-16 | Stop reason: HOSPADM

## 2020-11-16 RX ORDER — EPINEPHRINE 0.1 MG/ML
1 INJECTION INTRACARDIAC; INTRAVENOUS
Status: DISCONTINUED | OUTPATIENT
Start: 2020-11-16 | End: 2020-11-16 | Stop reason: HOSPADM

## 2020-11-16 RX ORDER — PROPOFOL 10 MG/ML
INJECTION, EMULSION INTRAVENOUS AS NEEDED
Status: DISCONTINUED | OUTPATIENT
Start: 2020-11-16 | End: 2020-11-16 | Stop reason: HOSPADM

## 2020-11-16 RX ORDER — ATROPINE SULFATE 0.1 MG/ML
0.5 INJECTION INTRAVENOUS
Status: DISCONTINUED | OUTPATIENT
Start: 2020-11-16 | End: 2020-11-16 | Stop reason: HOSPADM

## 2020-11-16 RX ORDER — DIPHENHYDRAMINE HYDROCHLORIDE 50 MG/ML
12.5 INJECTION, SOLUTION INTRAMUSCULAR; INTRAVENOUS AS NEEDED
Status: DISCONTINUED | OUTPATIENT
Start: 2020-11-16 | End: 2020-11-16 | Stop reason: HOSPADM

## 2020-11-16 RX ORDER — LIDOCAINE HYDROCHLORIDE 20 MG/ML
INJECTION, SOLUTION EPIDURAL; INFILTRATION; INTRACAUDAL; PERINEURAL AS NEEDED
Status: DISCONTINUED | OUTPATIENT
Start: 2020-11-16 | End: 2020-11-16 | Stop reason: HOSPADM

## 2020-11-16 RX ORDER — NALOXONE HYDROCHLORIDE 0.4 MG/ML
0.4 INJECTION, SOLUTION INTRAMUSCULAR; INTRAVENOUS; SUBCUTANEOUS
Status: DISCONTINUED | OUTPATIENT
Start: 2020-11-16 | End: 2020-11-16 | Stop reason: HOSPADM

## 2020-11-16 RX ORDER — MIDAZOLAM HYDROCHLORIDE 1 MG/ML
.25-5 INJECTION, SOLUTION INTRAMUSCULAR; INTRAVENOUS
Status: DISCONTINUED | OUTPATIENT
Start: 2020-11-16 | End: 2020-11-16 | Stop reason: HOSPADM

## 2020-11-16 RX ORDER — HYDROMORPHONE HYDROCHLORIDE 1 MG/ML
.2-.5 INJECTION, SOLUTION INTRAMUSCULAR; INTRAVENOUS; SUBCUTANEOUS ONCE
Status: DISCONTINUED | OUTPATIENT
Start: 2020-11-16 | End: 2020-11-16 | Stop reason: HOSPADM

## 2020-11-16 RX ADMIN — SODIUM CHLORIDE, SODIUM LACTATE, POTASSIUM CHLORIDE, AND CALCIUM CHLORIDE 25 ML/HR: 600; 310; 30; 20 INJECTION, SOLUTION INTRAVENOUS at 06:46

## 2020-11-16 RX ADMIN — PROPOFOL 50 MG: 10 INJECTION, EMULSION INTRAVENOUS at 07:46

## 2020-11-16 RX ADMIN — LIDOCAINE HYDROCHLORIDE 40 MG: 20 INJECTION, SOLUTION EPIDURAL; INFILTRATION; INTRACAUDAL; PERINEURAL at 07:44

## 2020-11-16 RX ADMIN — SODIUM CHLORIDE, SODIUM LACTATE, POTASSIUM CHLORIDE, AND CALCIUM CHLORIDE: 600; 310; 30; 20 INJECTION, SOLUTION INTRAVENOUS at 07:41

## 2020-11-16 RX ADMIN — PROPOFOL 50 MG: 10 INJECTION, EMULSION INTRAVENOUS at 07:52

## 2020-11-16 RX ADMIN — PROPOFOL 50 MG: 10 INJECTION, EMULSION INTRAVENOUS at 07:49

## 2020-11-16 RX ADMIN — PROPOFOL 50 MG: 10 INJECTION, EMULSION INTRAVENOUS at 07:44

## 2020-11-16 NOTE — H&P
Pre-endoscopy H and P    The patient was seen and examined in the room/pre-op holding area. The airway was assessed and documented. The problem list, past medical history, and medications were reviewed. Patient Active Problem List   Diagnosis Code    Primary osteoarthritis of right hip M16.11    Primary osteoarthritis of both knees M17.0    Primary osteoarthritis of both hands M19.041, M19.042    DISH (diffuse idiopathic skeletal hyperostosis) M48.10    Obesity (BMI 30-39. 9) E66.9     Social History     Socioeconomic History    Marital status:      Spouse name: Not on file    Number of children: Not on file    Years of education: Not on file    Highest education level: Not on file   Occupational History    Not on file   Social Needs    Financial resource strain: Not on file    Food insecurity     Worry: Not on file     Inability: Not on file    Transportation needs     Medical: Not on file     Non-medical: Not on file   Tobacco Use    Smoking status: Former Smoker     Last attempt to quit: 1990     Years since quittin.5    Smokeless tobacco: Never Used   Substance and Sexual Activity    Alcohol use: No    Drug use: Not Currently    Sexual activity: Not on file   Lifestyle    Physical activity     Days per week: Not on file     Minutes per session: Not on file    Stress: Not on file   Relationships    Social connections     Talks on phone: Not on file     Gets together: Not on file     Attends Congregation service: Not on file     Active member of club or organization: Not on file     Attends meetings of clubs or organizations: Not on file     Relationship status: Not on file    Intimate partner violence     Fear of current or ex partner: Not on file     Emotionally abused: Not on file     Physically abused: Not on file     Forced sexual activity: Not on file   Other Topics Concern    Not on file   Social History Narrative    Not on file     Past Medical History: Diagnosis Date    Arthritis     lower back and knees    CAD (coronary artery disease)     repaired hole in heart as a child    CAD (coronary artery disease)     Kunal Bucio MD    Congestive heart failure (HCC)     Deviated septum     History of artificial heart valve     Low testosterone     Pacemaker     Skin cancer     on scalp    Sleep apnea     uses CPAP         Prior to Admission Medications   Prescriptions Last Dose Informant Patient Reported? Taking? Aspirin, Buffered 81 mg tab 11/15/2020 at Unknown time  Yes Yes   Sig: Take  by mouth daily. METOPROLOL TARTRATE PO 11/15/2020 at Unknown time  Yes Yes   Sig: Take 25 mg by mouth two (2) times a day. Omega-3 Fatty Acids 300 mg cap 2020 at Unknown time  Yes Yes   Sig: Take 2 Caps by mouth daily. acetaminophen (TYLENOL ARTHRITIS PAIN) 650 mg CR tablet 11/15/2020 at Unknown time  Yes Yes   Sig: Take 1,300 mg by mouth every six (6) hours as needed for Pain. diphenhydrAMINE (BENADRYL ALLERGY) 25 mg tablet Unknown at Unknown time  Yes No   Sig: Take 50 mg by mouth every six (6) hours as needed for Sleep.   enoxaparin (LOVENOX) 80 mg/0.8 mL injection 11/15/2020 at 2100  Yes Yes   Si mg by SubCUTAneous route every twelve (12) hours. fluorouracil (EFUDEX) 5 % chemo cream 2020  Yes Yes   Sig: Apply 1 Each to affected area every Monday, Thursday, Saturday. On scalp for precancerous lesions   furosemide (LASIX) 80 mg tablet 11/15/2020 at Unknown time  Yes Yes   Sig: Take 80 mg by mouth daily. multivitamin (ONE A DAY) tablet 2020 at Unknown time  Yes Yes   Sig: Take 1 Tab by mouth daily. potassium chloride 20 mEq TbER 11/15/2020 at Unknown time  Yes Yes   Sig: Take 20 mEq by mouth daily. testosterone (ANDROGEL) 20.25 mg/1.25 gram (1.62 %) gel 11/15/2020 at Unknown time  Yes Yes   Sig: Apply 20.25 mg to affected area daily. warfarin (COUMADIN) 6 mg tablet 2020  Yes Yes   Sig: Take 6 mg by mouth daily.  Pt takes 6 mg every day      Facility-Administered Medications: None           The review of systems is:  Negative  for shortness of breath or chest pain      The heart, lungs, and mental status were satisfactory for the administration of deep sedation and for the procedure. I discussed with the patient the objectives, risks, consequences and alternatives to the procedure.       Cheryl Dobson MD  11/16/2020  7:36 AM

## 2020-11-16 NOTE — PERIOP NOTES
Amanda Lopez Blunt  1950  [id number]    Situation:  Verbal report given from: [address] JENNIFER Hendrickson RN  Procedure: Procedure(s):  COLONOSCOPY  ENDOSCOPIC POLYPECTOMY    Background:    Preoperative diagnosis: PERSONAL HX OF COLONIC POLYPS, AORTIC VALVE DISORDER, CONGESTIVE HEART FAILURE    Postoperative diagnosis: Diverticulosis, ascending colon polyp, hemorrhoids    :  Dr. Sylvia Galeano    Assistant(s): Circ-1: Olvin Bryant RN  Scrub Tech-2: Oniel Gunderson  Scrub RN-1: Bernardo Longo RN    Specimens:   ID Type Source Tests Collected by Time Destination   1 : Ascending colon polyp Preservative Colon, Ascending  Jem Singh MD 11/16/2020 4249 Pathology       Assessment:  Intra-procedure medications   Propofol 200 mg      Anesthesia gave intra-procedure sedation and medications, see anesthesia flow sheet     Intravenous fluids: LR@ [facility]     Vital signs stable     Abdominal assessment: round and soft nontender      Recommendation:        All side rails up, bed in low position, wheels locked. Nurse at bedside.

## 2020-11-16 NOTE — PERIOP NOTES
Permission received to review discharge instructions and discuss private health information with Maryan Channel. Patient states that Maryan Channel will be with them for at least 24 hours following today's procedure.

## 2020-11-16 NOTE — PERIOP NOTES
Pt tolerating liquids, vss.    0820-D/c instructions reviewed, all questions answered. Reviewed when to call the doctor, diet and activity. Reviewed to continue lovenox today, restart coumadin tomorrow as pt has a AVR. Pt getting blood level being retested on Wednesday through PCP. Instructed pt to wear CPAP machine today while resting. 0836-Transported via w/c to awaiting transportation.

## 2020-11-16 NOTE — PROCEDURES
Colonoscopy Procedure Note    Rashel Licona  1950  [id number]    Indications:  Please see below. Pre-operative Diagnosis: PERSONAL HX OF COLONIC POLYPS    Post-operative Diagnosis: Diverticulosis, ascending colon polyp, hemorrhoids      : Jan Pierce MD    Referring Provider: Michael Mejia MD    Sedation:  MAC anesthesia Propofol        Procedure Details:    After detailed informed consent was obtained with all risks and benefits of procedure explained and preoperative exam completed, the patient was taken to the endoscopy suite and placed in the left lateral decubitus position. Upon sequential sedation as per above, a digital rectal exam was performed  And was normal.  The Olympus videocolonoscope  was inserted in the rectum and carefully advanced to the cecum, which was identified by the appendiceal orifice. The quality of preparation was fair. The colonoscope was slowly withdrawn with careful evaluation between folds. Retroflexion in the rectum was performed. Findings:   · A single 7 mm sessile ascending colon polyp was removed with a cold snare. · Mild sigmoid diverticulosis. · Fair colonic prep. Mylicon and power washings used. · Medium internal hemorrhoids. · Rest of the colon is normal appearing. Therapies:  1 complete polypectomy was performed using cold snare  and the polyp was retrieved    Specimen:  Specimens were collected as described above and sent to pathology. Complications: None were encountered during the procedure. EBL:  None. Recommendations:     -Await pathology. -If adenoma is present, repeat colonoscopy in 3 years. Naturally, for new bleeding, unexplained weight loss,change in bowel habits and anemia, an earlier colonoscopy should be considered. Jan Pierce MD  11/16/2020  7:58 AM

## 2020-11-16 NOTE — ANESTHESIA POSTPROCEDURE EVALUATION
Procedure(s):  COLONOSCOPY  ENDOSCOPIC POLYPECTOMY. MAC    Anesthesia Post Evaluation        Patient location during evaluation: PACU  Note status: Adequate. Level of consciousness: responsive to verbal stimuli and sleepy but conscious  Pain management: satisfactory to patient  Airway patency: patent  Anesthetic complications: no  Cardiovascular status: acceptable  Respiratory status: acceptable  Hydration status: acceptable  Comments: +Post-Anesthesia Evaluation and Assessment    Patient: Maine Licona MRN: [id number]  SSN: [id number]   YOB: 1950  Age: 69 y.o. Sex: male      Cardiovascular Function/Vital Signs    /74   Pulse 70   Temp 36.1 °C (97 °F)   Resp 18   Ht 5' 9\" (1.753 m)   Wt 112.8 kg (248 lb 9.6 oz)   SpO2 99%   BMI 36.71 kg/m²     Patient is status post Procedure(s):  COLONOSCOPY  ENDOSCOPIC POLYPECTOMY. Nausea/Vomiting: Controlled. Postoperative hydration reviewed and adequate. Pain:  Pain Scale 1: Numeric (0 - 10) (11/16/20 0802)  Pain Intensity 1: 0 (11/16/20 0802)   Managed. Neurological Status: At baseline. Mental Status and Level of Consciousness: Arousable. Pulmonary Status:   O2 Device: Nasal cannula (11/16/20 0758)   Adequate oxygenation and airway patent. Complications related to anesthesia: None    Post-anesthesia assessment completed. No concerns. Signed By: Nawaf Adorno MD    11/16/2020  Post anesthesia nausea and vomiting:  controlled      INITIAL Post-op Vital signs:   Vitals Value Taken Time   /73 11/16/2020  8:21 AM   Temp     Pulse 77 11/16/2020  8:22 AM   Resp 22 11/16/2020  8:22 AM   SpO2 100 % 11/16/2020  8:22 AM   Vitals shown include unvalidated device data.

## 2020-11-16 NOTE — ANESTHESIA PREPROCEDURE EVALUATION
Relevant Problems   No relevant active problems       Anesthetic History               Review of Systems / Medical History  Patient summary reviewed, nursing notes reviewed and pertinent labs reviewed    Pulmonary        Sleep apnea: CPAP  Smoker      Comments: Former Smoker   Neuro/Psych   Within defined limits           Cardiovascular          CHF    Pacemaker and CAD    Exercise tolerance: >4 METS  Comments: Hx pacemaker/defibrillator  HX AORTIC VALVE REPLACEMENT      GI/Hepatic/Renal  Within defined limits              Endo/Other        Obesity and arthritis     Other Findings              Physical Exam    Airway  Mallampati: III    Neck ROM: normal range of motion   Mouth opening: Normal     Cardiovascular  Regular rate and rhythm,  S1 and S2 normal,  no murmur, click, rub, or gallop             Dental  No notable dental hx       Pulmonary  Breath sounds clear to auscultation               Abdominal  GI exam deferred       Other Findings            Anesthetic Plan    ASA: 3  Anesthesia type: MAC          Induction: Intravenous  Anesthetic plan and risks discussed with: Patient

## 2020-11-16 NOTE — DISCHARGE INSTRUCTIONS
[place] Office: [phone]    [address]  [id number]  1950    EGD/COLONOSCOPY DISCHARGE INSTRUCTIONS  Discomfort:  Sore throat- throat lozenges or warm salt water gargle  redness at IV site- apply warm compress to area; if redness or soreness persist- contact your physician  Gaseous discomfort- walking, belching will help relieve any discomfort  You may not operate a vehicle for 12 hours  You may not engage in an occupation involving machinery or appliances for rest of today. You may not drink alcoholic beverages for at least 12 hours  Avoid making any critical decisions for at least 24 hour  DIET  You may resume your regular diet - however -  remember your colon is empty and a heavy meal will produce gas. Avoid these foods:  fried / greasy foods, excessive carbonated drinks or too much caffeine  MEDICATIONS   Regarding Aspirin or Nonsteroidal medications specifically, please see below. ACTIVITY  You may resume your normal daily activities. Spend the remainder of the day resting -  avoid any strenuous activity. CALL M.D. ANY SIGN OF   Increasing pain, nausea, vomiting  Abdominal distension (swelling)  New increased bleeding (oral or rectal)  Fever (chills)  Pain in chest area  Bloody discharge from nose or mouth  Shortness of breath    You may not take any Advil, Aspirin, Ibuprofen, Motrin, Aleve, or Goodys for 5 days, ONLY  Tylenol as needed for pain. Start Coumadin tommorrow    TAKE LOVENOX AS DIRECTED BY CARDIOLOGIST    Follow-up Instructions:   Call  [address] AAntonina Curry MD for any questions or concerns  Results of procedure / biopsy in 7 days   Telephone # [phone]      Follow-up Information    None              DO NOT TAKE SLEEPING MEDICATIONS OR ANTIANXIETY MEDICATIONS THE NIGHT OF ANESTHESIA. CPAP PATIENTS BE SURE TO WEAR MACHINE WHENEVER NAPPING OR SLEEPING.     DISCHARGE SUMMARY from Nurse    The following personal items collected during your admission are returned to you: Dental Appliance: Dental Appliances: None  Vision: Visual Aid: Glasses  Hearing Aid:    Jewelry:    Clothing:    Other Valuables:    Valuables sent to safe:        PATIENT INSTRUCTIONS:    Anesthesia Discharge Instructions for Procedural Area requiring Sedation (MAC Anesthesia, Cath Lab, Endo and Radiology): You have been given medications during your procedure that may affect your memory and mental judgement for the next 24 hours. During this time frame for your safety, please follow the instructions listed below :    Have a responsible adult to drive you home and be with you for at least 12 hours.  Rest today and resume normal activities tomorrow.  Start with a soft bland diet and advance as tolerated to your recommended diet.  Do not drive any motor vehicle or operate mechanical or electrical equipment prior to [employer].  Avoid making critical decisions or signing legal documents prior to 6am tomorrow.  Do not drink alcohol prior to 6am tomorrow.  If you have sleep apnea and you plan to go home and take a nap, please use your CPAP machine not only at bedtime, but also while napping for 24 hours.  If you notice any redness or swelling on parts of your body where IV medications were given, place a warm wet washcloth over the area for 20 minutes at a time until the redness or swelling goes away. If you still have redness or swelling after 2-3 days, please call us. · You will receive a Post Operative Call from one of the Recovery Room Nurses on the day after your surgery to check on you. It is very important for us to know how you are recovering after your surgery. If you have an issue or need to speak with someone, please call your surgeon, do not wait for the post operative call. · You may receive an e-mail or letter in the mail from [employer] regarding your experience with us in the Ambulatory Surgery Unit.  Your feedback is valuable to us and we appreciate your participation in the survey. · We wish you a speedy recovery ? Patient Education        Learning About Coronavirus [phone])  Coronavirus [phone]): Overview  What is coronavirus ([id number])? The coronavirus disease (COVID-19) is caused by a virus. It is an illness that was first found in December 2019. It has since spread worldwide. The virus can cause fever, cough, and trouble breathing. In severe cases, it can cause pneumonia and make it hard to breathe without help. It can cause death. This virus spreads person-to-person through droplets from coughing and sneezing. It can also spread when you are close to someone who is infected. And it can spread when you touch something that has the virus on it, such as a doorknob or a tabletop. Coronaviruses are a large group of viruses. They cause the common cold. They also cause more serious illnesses like Middle East respiratory syndrome (MERS) and severe acute respiratory syndrome (SARS). COVID-19 is caused by a novel coronavirus. That means it's a new type that has not been seen in people before. How is COVID-19 treated? Mild illness can be treated at home, but more serious illness needs to be treated in the hospital. Treatment may include medicines to reduce symptoms, plus breathing support such as oxygen therapy or a ventilator. Other treatments, such as antiviral medicines, may help people who have COVID-19. What can you do to protect yourself from COVID-19? The best way to protect yourself from getting sick is to:  · Avoid areas where there is an outbreak. · Avoid contact with people who may be infected. · Avoid crowds and try to stay at least 6 feet away from other people. · Wash your hands often, especially after you cough or sneeze. Use soap and water, and scrub for at least 20 seconds. If soap and water aren't available, use an alcohol-based hand . · Avoid touching your mouth, nose, and eyes.   What can you do to avoid spreading the virus to others? To help avoid spreading the virus to others:  · Wash your hands often with soap or alcohol-based hand sanitizers. · Cover your mouth with a tissue when you cough or sneeze. Then throw the tissue in the trash. · Use a disinfectant to clean things that you touch often. These include doorknobs, remote controls, phones, and handles on your refrigerator and microwave. And don't forget countertops, tabletops, bathrooms, and computer keyboards. · Wear a cloth face cover if you have to go to public areas. If you know or suspect that you have COVID-19:  · Stay home. Don't go to school, work, or public areas. And don't use public transportation, ride-shares, or taxis unless you have no choice. · Leave your home only if you need to get medical care or testing. But call the doctor's office first so they know you're coming. And wear a face cover. · Limit contact with people in your home. If possible, stay in a separate bedroom and use a separate bathroom. · Wear a face cover whenever you're around other people. It can help stop the spread of the virus when you cough or sneeze. · Clean and disinfect your home every day. Use household  and disinfectant wipes or sprays. Take special care to clean things that you grab with your hands. · Self-isolate until it's safe to be around others again. ? If you have symptoms, it's safe when you haven't had a fever for 3 days and your symptoms have improved and it's been at least 10 days since your symptoms started. ? If you were exposed to the virus but don't have symptoms, it's safe to be around others 14 days after exposure. ? Talk to your doctor about whether you also need testing, especially if you have a weakened immune system. When to call for help  Call 911 anytime you think you may need emergency care. For example, call if:  · You have severe trouble breathing. (You can't talk at all.)  · You have constant chest pain or pressure.   · You are severely dizzy or lightheaded. · You are confused or can't think clearly. · Your face and lips have a blue color. · You passed out (lost consciousness) or are very hard to wake up. Call your doctor now if you develop symptoms such as:  · Shortness of breath. · Fever. · Cough. If you need to get care, call ahead to the doctor's office for instructions before you go. Make sure you wear a face cover to prevent exposing other people to the virus. Where can you get the latest information? The following health organizations are tracking and studying this virus. Their websites contain the most up-to-date information. Nitin Gorekarishma also learn what to do if you think you may have been exposed to the virus. · U.S. Centers for Disease Control and Prevention (CDC): The CDC provides updated news about the disease and travel advice. The website also tells you how to prevent the spread of infection. www.cdc.gov  · World Health Organization [facility]): WHO offers information about the virus outbreaks. WHO also has travel advice. www.who.int  Current as of: July 10, 2020               Content Version: 12.6  © 0271-0057 [employer]. Care instructions adapted under license by [employer] (which disclaims liability or warranty for this information). If you have questions about a medical condition or this instruction, always ask your healthcare professional. [address] any warranty or liability for your use of this information. What to do at Home:      *  Please give a list of your current medications to your Primary Care Provider. *  Please update this list whenever your medications are discontinued, doses are      changed, or new medications (including over-the-counter products) are added. *  Please carry medication information at all times in case of emergency situations.                   If you have not received your influenza and/or pneumococcal vaccine, please follow up with your primary care physician. The discharge information has been reviewed with the {PATIENT PARENT GUARDIAN:54268}. The {PATIENT PARENT GUARDIAN:87813} verbalized understanding.

## 2022-03-18 PROBLEM — M19.041 PRIMARY OSTEOARTHRITIS OF BOTH HANDS: Status: ACTIVE | Noted: 2017-03-27

## 2022-03-18 PROBLEM — M19.042 PRIMARY OSTEOARTHRITIS OF BOTH HANDS: Status: ACTIVE | Noted: 2017-03-27

## 2022-03-18 PROBLEM — M16.11 PRIMARY OSTEOARTHRITIS OF RIGHT HIP: Status: ACTIVE | Noted: 2017-03-27

## 2022-03-19 PROBLEM — M17.0 PRIMARY OSTEOARTHRITIS OF BOTH KNEES: Status: ACTIVE | Noted: 2017-03-27

## 2022-03-19 PROBLEM — E66.9 OBESITY (BMI 30-39.9): Status: ACTIVE | Noted: 2017-03-27

## 2022-03-19 PROBLEM — M48.10 DISH (DIFFUSE IDIOPATHIC SKELETAL HYPEROSTOSIS): Status: ACTIVE | Noted: 2017-03-27

## 2023-11-20 ENCOUNTER — HOSPITAL ENCOUNTER (OUTPATIENT)
Facility: HOSPITAL | Age: 73
Setting detail: OUTPATIENT SURGERY
Discharge: HOME | End: 2023-11-20
Attending: INTERNAL MEDICINE | Admitting: INTERNAL MEDICINE
Payer: MEDICARE

## 2023-11-20 ENCOUNTER — ANESTHESIA (OUTPATIENT)
Facility: HOSPITAL | Age: 73
End: 2023-11-20
Payer: MEDICARE

## 2023-11-20 ENCOUNTER — ANESTHESIA EVENT (OUTPATIENT)
Facility: HOSPITAL | Age: 73
End: 2023-11-20
Payer: MEDICARE

## 2023-11-20 VITALS
HEIGHT: 69 IN | SYSTOLIC BLOOD PRESSURE: 126 MMHG | TEMPERATURE: 97.7 F | WEIGHT: 252.4 LBS | RESPIRATION RATE: 16 BRPM | HEART RATE: 79 BPM | DIASTOLIC BLOOD PRESSURE: 65 MMHG | OXYGEN SATURATION: 96 % | BODY MASS INDEX: 37.38 KG/M2

## 2023-11-20 PROCEDURE — 7100000011 HC PHASE II RECOVERY - ADDTL 15 MIN: Performed by: INTERNAL MEDICINE

## 2023-11-20 PROCEDURE — 6360000002 HC RX W HCPCS: Performed by: NURSE ANESTHETIST, CERTIFIED REGISTERED

## 2023-11-20 PROCEDURE — 3600007512: Performed by: INTERNAL MEDICINE

## 2023-11-20 PROCEDURE — 3700000001 HC ADD 15 MINUTES (ANESTHESIA): Performed by: INTERNAL MEDICINE

## 2023-11-20 PROCEDURE — 2580000003 HC RX 258: Performed by: NURSE ANESTHETIST, CERTIFIED REGISTERED

## 2023-11-20 PROCEDURE — 2709999900 HC NON-CHARGEABLE SUPPLY: Performed by: INTERNAL MEDICINE

## 2023-11-20 PROCEDURE — 3600007502: Performed by: INTERNAL MEDICINE

## 2023-11-20 PROCEDURE — 7100000010 HC PHASE II RECOVERY - FIRST 15 MIN: Performed by: INTERNAL MEDICINE

## 2023-11-20 PROCEDURE — 88305 TISSUE EXAM BY PATHOLOGIST: CPT

## 2023-11-20 PROCEDURE — 6370000000 HC RX 637 (ALT 250 FOR IP): Performed by: INTERNAL MEDICINE

## 2023-11-20 PROCEDURE — 3700000000 HC ANESTHESIA ATTENDED CARE: Performed by: INTERNAL MEDICINE

## 2023-11-20 PROCEDURE — 2500000003 HC RX 250 WO HCPCS: Performed by: NURSE ANESTHETIST, CERTIFIED REGISTERED

## 2023-11-20 RX ORDER — GABAPENTIN 300 MG/1
300 CAPSULE ORAL 3 TIMES DAILY
COMMUNITY

## 2023-11-20 RX ORDER — LIDOCAINE HYDROCHLORIDE 20 MG/ML
INJECTION, SOLUTION EPIDURAL; INFILTRATION; INTRACAUDAL; PERINEURAL PRN
Status: DISCONTINUED | OUTPATIENT
Start: 2023-11-20 | End: 2023-11-20 | Stop reason: SDUPTHER

## 2023-11-20 RX ORDER — SODIUM CHLORIDE 9 MG/ML
25 INJECTION, SOLUTION INTRAVENOUS PRN
Status: DISCONTINUED | OUTPATIENT
Start: 2023-11-20 | End: 2023-11-20 | Stop reason: HOSPADM

## 2023-11-20 RX ORDER — SIMETHICONE 20 MG/.3ML
EMULSION ORAL PRN
Status: DISCONTINUED | OUTPATIENT
Start: 2023-11-20 | End: 2023-11-20 | Stop reason: ALTCHOICE

## 2023-11-20 RX ORDER — PHENYLEPHRINE HCL IN 0.9% NACL 0.4MG/10ML
SYRINGE (ML) INTRAVENOUS PRN
Status: DISCONTINUED | OUTPATIENT
Start: 2023-11-20 | End: 2023-11-20 | Stop reason: SDUPTHER

## 2023-11-20 RX ORDER — EPHEDRINE SULFATE/0.9% NACL/PF 50 MG/5 ML
SYRINGE (ML) INTRAVENOUS PRN
Status: DISCONTINUED | OUTPATIENT
Start: 2023-11-20 | End: 2023-11-20 | Stop reason: SDUPTHER

## 2023-11-20 RX ORDER — SODIUM CHLORIDE 0.9 % (FLUSH) 0.9 %
5-40 SYRINGE (ML) INJECTION EVERY 12 HOURS SCHEDULED
Status: DISCONTINUED | OUTPATIENT
Start: 2023-11-20 | End: 2023-11-20 | Stop reason: HOSPADM

## 2023-11-20 RX ORDER — SODIUM CHLORIDE 0.9 % (FLUSH) 0.9 %
5-40 SYRINGE (ML) INJECTION PRN
Status: DISCONTINUED | OUTPATIENT
Start: 2023-11-20 | End: 2023-11-20 | Stop reason: HOSPADM

## 2023-11-20 RX ORDER — ASPIRIN 81 MG/1
81 TABLET, CHEWABLE ORAL DAILY
COMMUNITY

## 2023-11-20 RX ORDER — SODIUM CHLORIDE, SODIUM LACTATE, POTASSIUM CHLORIDE, CALCIUM CHLORIDE 600; 310; 30; 20 MG/100ML; MG/100ML; MG/100ML; MG/100ML
INJECTION, SOLUTION INTRAVENOUS CONTINUOUS PRN
Status: DISCONTINUED | OUTPATIENT
Start: 2023-11-20 | End: 2023-11-20 | Stop reason: SDUPTHER

## 2023-11-20 RX ADMIN — Medication 40 MCG: at 07:14

## 2023-11-20 RX ADMIN — LIDOCAINE HYDROCHLORIDE 40 MG: 20 INJECTION, SOLUTION EPIDURAL; INFILTRATION; INTRACAUDAL; PERINEURAL at 07:08

## 2023-11-20 RX ADMIN — Medication 10 MG: at 07:27

## 2023-11-20 RX ADMIN — PROPOFOL 50 MG: 10 INJECTION, EMULSION INTRAVENOUS at 07:12

## 2023-11-20 RX ADMIN — Medication 80 MCG: at 07:22

## 2023-11-20 RX ADMIN — PROPOFOL 50 MG: 10 INJECTION, EMULSION INTRAVENOUS at 07:26

## 2023-11-20 RX ADMIN — PROPOFOL 50 MG: 10 INJECTION, EMULSION INTRAVENOUS at 07:20

## 2023-11-20 RX ADMIN — SODIUM CHLORIDE, POTASSIUM CHLORIDE, SODIUM LACTATE AND CALCIUM CHLORIDE: 600; 310; 30; 20 INJECTION, SOLUTION INTRAVENOUS at 07:08

## 2023-11-20 RX ADMIN — PROPOFOL 50 MG: 10 INJECTION, EMULSION INTRAVENOUS at 07:09

## 2023-11-20 ASSESSMENT — PAIN - FUNCTIONAL ASSESSMENT: PAIN_FUNCTIONAL_ASSESSMENT: 0-10

## 2023-11-20 ASSESSMENT — PAIN SCALES - GENERAL
PAINLEVEL_OUTOF10: 0
PAINLEVEL_OUTOF10: 0

## 2023-11-20 ASSESSMENT — PAIN DESCRIPTION - DESCRIPTORS: DESCRIPTORS: ACHING

## 2023-11-20 NOTE — PROGRESS NOTES
ARRIVAL INFORMATION:  Verified patient name and date of birth, scheduled procedure, and informed consent. : Vi Rolle, wife, contact number: [phone]  Physician and staff can share information with the . Belongings with patient include:  Clothing,Glasses, Jewelry, Watch - (x1 ring)    GI FOCUSED ASSESSMENT:  Neuro: Awake, alert, oriented x4  Respiratory: even and unlabored   GI: rotund, semi-soft and non-distended - patient report this is his \"normal size and feel\" of his abdomen. EKG Rhythm: paced (per patient he has AICD)    Education:Reviewed general discharge instructions and  information.

## 2023-11-20 NOTE — H&P
[facility]  Outpatient History and Physical    Patient: Manolo Lama Blunt    Physician: Eleuterio Tay MD    Vital Signs: Blood pressure (!) 163/84, pulse 75, resp. rate 20, height 1.753 m (5' 9\"), weight 114.5 kg (252 lb 6.4 oz), SpO2 97 %. Allergies:   No Known Allergies    Chief Complaint: screening colonoscopy    History:  Past Medical History:   Diagnosis Date    Arthritis     lower back and knees    CAD (coronary artery disease)     repaired hole in heart as a child    CAD (coronary artery disease)     Bo Sykes MD    Congestive heart failure (HCC)     Deviated septum     History of artificial heart valve     Low testosterone     Pacemaker     Skin cancer     on scalp    Sleep apnea     uses CPAP      Past Surgical History:   Procedure Laterality Date    AORTIC VALVE REPLACEMENT      COLONOSCOPY N/A 2019    COLONOSCOPY performed by Regan Perkins MD at [facility] ENDOSCOPY    COLONOSCOPY N/A 2020    COLONOSCOPY performed by Regan Perkins MD at [facility] AMBULATORY OR    OTHER SURGICAL HISTORY      skin cancer removed from scalp    PACEMAKER      lt chest (AICD)    PACEMAKER PLACEMENT      MCV    TONSILLECTOMY      WISDOM TOOTH EXTRACTION        Social History     Socioeconomic History    Marital status:      Spouse name: None    Number of children: None    Years of education: None    Highest education level: None   Tobacco Use    Smoking status: Former     Types: Cigarettes     Quit date: 1990     Years since quittin.5    Smokeless tobacco: Never   Substance and Sexual Activity    Alcohol use: No    Drug use: Not Currently      Family History   Problem Relation Age of Onset    Breast Cancer Mother     Diabetes Brother     Diabetes Mother     Heart Disease Father        Medications:   Prior to Admission medications    Medication Sig Start Date End Date Taking?  Authorizing Provider   aspirin 81 MG chewable tablet Take 1 tablet by mouth daily

## 2023-11-20 NOTE — PROGRESS NOTES
Endoscopy Case End Note:    7818:  Procedure scope was pre-cleaned, per protocol, at bedside by Vicente Mayers RN.      8430:  Report received from anesthesia - Barbie López, [address] . See anesthesia flowsheet for intra-procedure vital signs and events. 4259:  Glasses returned to patient.

## 2023-11-20 NOTE — DISCHARGE INSTRUCTIONS
Thierno Morillo  [id number]  1950    It was my pleasure seeing you for your procedure. You will also receive a summary report with the findings from this procedure and any further recommendations. If you had polyps removed or biopsies taken during your procedure, you will receive a separate letter from me within the next 2 weeks. If you don't receive this letter or if you have any questions, please call my office [phone]. Please take note of the post procedure instructions listed below. Best Wishes,    Dr. Kelsi Babb    These instructions give you information on caring for yourself after your procedure. Call your doctor if you have any problems or questions after your procedure. HOME CARE  Walk if you have belly cramping or gas. Walking will help get rid of the air and reduce the bloated feeling in your belly (abdomen). Your IV site (where you received drugs) may be tender to touch. Place warm towels on the site; keep your arm up on two pillows if you have any swelling or soreness in the area. You may shower. ACTIVITY:  Take frequent rest periods and move at a slower pace for the next 24 hours. .  You may resume your regular activity tomorrow if you are feeling back to normal.  Do not drive or ride a bicycle for at least 24 hours (because of the medicine (anesthesia) used during the test). Do not sign any important legal documents or use or operate any machinery for 24 hours  Do not take sleeping medicines/nerve drugs for 24 hours unless the doctor tells you. You can return to work/school tomorrow unless otherwise instructed. NUTRITION:  Drink plenty of fluids to keep your pee (urine) clear or pale yellow  Begin with a light meal and progress to your normal diet. Heavy or fried foods are harder to digest and may make you feel sick to your stomach (nauseated).   Once you are feeling back to normal, you may resume your normal diet

## 2023-11-20 NOTE — PROGRESS NOTES
Mary Morillo  1950  [id number]    Situation:  Verbal report received from: Esa Pritchard  Procedure: Procedure(s):  COLORECTAL CANCER SCREENING, HIGH RISK  COLONOSCOPY POLYPECTOMY SNARE/COLD BIOPSY    Background:    Preoperative diagnosis: Special screening for malignant neoplasms, colon [Z12.11]  Postoperative diagnosis: * No post-op diagnosis entered *    :  Dr. Shahnaz Lu  Assistant(s): Circulator: Zenobia Pagan RN  Circulator Assist: Luigi Erazo RN      Vital signs stable   Abdominal assessment: round and soft       Patient returned to baseline, vital signs stable (see vital sign flowsheet). Patient offered liquids and tolerated well. Respiratory status within defined limits. Abdomen soft not tender. Skin with in defined limits. Responsible party driving patient home was given the opportunity to ask questions. Patient discharged with documented belongings.

## 2023-11-20 NOTE — ANESTHESIA POSTPROCEDURE EVALUATION
Department of Anesthesiology  Postprocedure Note    Patient: Brain Morillo  MRN: [id number]  YOB: 1950  Date of evaluation: 11/20/2023      Procedure Summary       Date: 11/20/23 Room / Location: [facility] ENDO 02 / [facility] ENDOSCOPY    Anesthesia Start: 1367 Anesthesia Stop: Nancy Sella    Procedures:       COLORECTAL CANCER SCREENING, HIGH RISK (Lower GI Region)      COLONOSCOPY POLYPECTOMY SNARE/COLD BIOPSY Diagnosis:       Encounter for screening colonoscopy      Personal history of colonic polyps      H/O mitral valve replacement with mechanical valve      Chronic anticoagulation      Colon, diverticulosis      Colon polyps      (Special screening for malignant neoplasms, colon [Z12.11])    Surgeons: Aleksandra Owusu MD Responsible Provider: Joie Moore MD    Anesthesia Type: MAC ASA Status: 2            Anesthesia Type: No value filed.     Romeo Phase I: Romeo Score: 10    Romeo Phase II: Romeo Score: 10      Anesthesia Post Evaluation    Patient location during evaluation: PACU  Patient participation: complete - patient participated  Level of consciousness: sleepy but conscious and responsive to verbal stimuli  Pain score: 2  Airway patency: patent  Nausea & Vomiting: no vomiting and no nausea  Complications: no  Cardiovascular status: blood pressure returned to baseline and hemodynamically stable  Respiratory status: acceptable  Hydration status: stable  Multimodal analgesia pain management approach  Pain management: adequate

## 2023-11-20 NOTE — OP NOTE
Colonoscopy Procedure Note      Indications:  high risk screening personal hx/o colon polyps; high risk anticoagulation hx/o mechanical mitral valve replacement    : Eleuterio Tay MD    Staff: Circulator: Koko Pretty RN  Circulator Assist: Fina Garcia RN    Referring Provider: Álvaro Dominguez MD    Sedation:  MAC anesthesia Propofol    Procedure Details:  After informed consent was obtained with all risks and benefits of procedure explained and preoperative exam completed, the patient was taken to the endoscopy suite and placed in the left lateral decubitus position. Upon sequential sedation as per above, a digital rectal exam was performed per below. The Olympus videocolonoscope was inserted in the rectum and carefully advanced to the cecum, which was identified by the ileocecal valve and appendiceal orifice. The quality of preparation was  good BPS 2/2/2 6 after extensive lavage ~2L lavaged/removed with good views . The colonoscope was slowly withdrawn with careful evaluation between folds. Retroflexion in the rectum was performed. Findings:   SPARKLE: unremarkable  Rectum:   - internal hemorrhoids on retroflexion  - one 3mm sessile polyp removed with cold snare polypectomy, retrieved, minimal bleeding  Sigmoid: mild diverticulosis, otherwise unremarkable  Descending Colon: normal  no mucosal lesion appreciated  Transverse Colon: one 3mm sessile polyp removed with cold snare polypectomy, retrieved, minimal bleeding  Ascending Colon: normal  no mucosal lesion appreciated  Cecum: 2 sessile polyps ranging 3-5mm removed with cold snare polypectomy, retrieved, minimal bleeding  Terminal Ileum: not intubated    Complications: None. EBL:  minimal    Impression:    See Findings above    Recommendations:   - Await pathology. You should receive a letter within 2 weeks.    - Resume normal medications.  - Recommend repeat colonoscopy in 3 years with a double prep; last

## 2024-02-09 ENCOUNTER — HOSPITAL ENCOUNTER (OUTPATIENT)
Facility: HOSPITAL | Age: 74
End: 2024-02-09
Payer: MEDICARE

## 2024-02-09 ENCOUNTER — TRANSCRIBE ORDERS (OUTPATIENT)
Facility: HOSPITAL | Age: 74
End: 2024-02-09

## 2024-02-09 DIAGNOSIS — M25.562 LEFT KNEE PAIN, UNSPECIFIED CHRONICITY: ICD-10-CM

## 2024-02-09 DIAGNOSIS — M25.562 LEFT KNEE PAIN, UNSPECIFIED CHRONICITY: Primary | ICD-10-CM

## 2024-02-09 PROCEDURE — 73562 X-RAY EXAM OF KNEE 3: CPT

## 2024-04-10 ENCOUNTER — HOSPITAL ENCOUNTER (OUTPATIENT)
Facility: HOSPITAL | Age: 74
Discharge: HOME | End: 2024-04-13
Payer: MEDICARE

## 2024-04-10 DIAGNOSIS — Z87.891 PERSONAL HISTORY OF NICOTINE DEPENDENCE: ICD-10-CM

## 2024-04-10 PROCEDURE — 71271 CT THORAX LUNG CANCER SCR C-: CPT

## 2024-05-25 ENCOUNTER — TRANSCRIBE ORDERS (OUTPATIENT)
Facility: HOSPITAL | Age: 74
End: 2024-05-25

## 2024-05-25 DIAGNOSIS — Z87.891 PERSONAL HISTORY OF NICOTINE DEPENDENCE: Primary | ICD-10-CM

## 2024-06-03 ENCOUNTER — TRANSCRIBE ORDERS (OUTPATIENT)
Facility: HOSPITAL | Age: 74
End: 2024-06-03

## 2024-06-03 DIAGNOSIS — Z13.6 SCREENING FOR ISCHEMIC HEART DISEASE: Primary | ICD-10-CM

## 2024-06-03 DIAGNOSIS — Z87.891 PERSONAL HISTORY OF TOBACCO USE, PRESENTING HAZARDS TO HEALTH: ICD-10-CM

## 2024-06-10 ENCOUNTER — HOSPITAL ENCOUNTER (OUTPATIENT)
Facility: HOSPITAL | Age: 74
Discharge: HOME | End: 2024-06-13
Payer: MEDICARE

## 2024-06-10 DIAGNOSIS — Z87.891 PERSONAL HISTORY OF TOBACCO USE, PRESENTING HAZARDS TO HEALTH: ICD-10-CM

## 2024-06-10 DIAGNOSIS — Z13.6 SCREENING FOR ISCHEMIC HEART DISEASE: ICD-10-CM

## 2024-06-10 LAB
VAS AORTA DIST AP: 1.7 CM
VAS AORTA MID AP: 2 CM
VAS AORTA MID TRANS: 1.8 CM
VAS AORTA PROX AP: 2.5 CM
VAS AORTA PROX TR: 2.6 CM
VAS LEFT COM ILIAC AP: 1.2 CM
VAS RIGHT COM ILIAC AP: 1.2 CM

## 2024-06-10 PROCEDURE — 76706 US ABDL AORTA SCREEN AAA: CPT

## 2024-09-11 ENCOUNTER — HOSPITAL ENCOUNTER (OUTPATIENT)
Facility: HOSPITAL | Age: 74
Discharge: HOME | End: 2024-09-14
Payer: MEDICARE

## 2024-09-11 DIAGNOSIS — R42 DIZZINESS AND GIDDINESS: ICD-10-CM

## 2024-09-11 LAB — CREAT BLD-MCNC: 1.2 MG/DL (ref 0.6–1.3)

## 2024-09-11 PROCEDURE — 82565 ASSAY OF CREATININE: CPT

## 2024-09-11 PROCEDURE — 70470 CT HEAD/BRAIN W/O & W/DYE: CPT

## 2024-09-11 PROCEDURE — 6360000004 HC RX CONTRAST MEDICATION: Performed by: STUDENT IN AN ORGANIZED HEALTH CARE EDUCATION/TRAINING PROGRAM

## 2024-09-11 RX ORDER — IOPAMIDOL 755 MG/ML
100 INJECTION, SOLUTION INTRAVASCULAR
Status: COMPLETED | OUTPATIENT
Start: 2024-09-11 | End: 2024-09-11

## 2024-09-11 RX ADMIN — IOPAMIDOL 100 ML: 755 INJECTION, SOLUTION INTRAVENOUS at 12:44

## 2025-03-23 ENCOUNTER — TRANSCRIBE ORDERS (OUTPATIENT)
Facility: HOSPITAL | Age: 75
End: 2025-03-23

## 2025-03-23 DIAGNOSIS — M54.50 LOW BACK PAIN, UNSPECIFIED BACK PAIN LATERALITY, UNSPECIFIED CHRONICITY, UNSPECIFIED WHETHER SCIATICA PRESENT: Primary | ICD-10-CM

## 2025-03-23 DIAGNOSIS — M51.16 LUMBAR DISC DISEASE WITH RADICULOPATHY: ICD-10-CM

## 2025-03-23 DIAGNOSIS — M51.362 DEGENERATION OF INTERVERTEBRAL DISC OF LUMBAR REGION WITH DISCOGENIC BACK PAIN AND LOWER EXTREMITY PAIN: ICD-10-CM

## 2025-03-30 ENCOUNTER — HOSPITAL ENCOUNTER (OUTPATIENT)
Facility: HOSPITAL | Age: 75
Discharge: HOME | End: 2025-04-02
Attending: PHYSICAL MEDICINE & REHABILITATION
Payer: MEDICARE

## 2025-03-30 DIAGNOSIS — M51.16 LUMBAR DISC DISEASE WITH RADICULOPATHY: ICD-10-CM

## 2025-03-30 DIAGNOSIS — M54.50 LOW BACK PAIN, UNSPECIFIED BACK PAIN LATERALITY, UNSPECIFIED CHRONICITY, UNSPECIFIED WHETHER SCIATICA PRESENT: ICD-10-CM

## 2025-03-30 DIAGNOSIS — M51.362 DEGENERATION OF INTERVERTEBRAL DISC OF LUMBAR REGION WITH DISCOGENIC BACK PAIN AND LOWER EXTREMITY PAIN: ICD-10-CM

## 2025-03-30 PROCEDURE — 72131 CT LUMBAR SPINE W/O DYE: CPT

## 2025-06-25 ENCOUNTER — TRANSCRIBE ORDERS (OUTPATIENT)
Facility: HOSPITAL | Age: 75
End: 2025-06-25

## 2025-06-25 DIAGNOSIS — Z87.891 PERSONAL HISTORY OF TOBACCO USE: Primary | ICD-10-CM

## 2025-06-25 DIAGNOSIS — F17.211 CIGARETTE NICOTINE DEPENDENCE IN REMISSION: ICD-10-CM

## 2025-07-07 ENCOUNTER — HOSPITAL ENCOUNTER (OUTPATIENT)
Facility: HOSPITAL | Age: 75
Discharge: HOME | End: 2025-07-10
Attending: FAMILY MEDICINE
Payer: MEDICARE

## 2025-07-07 DIAGNOSIS — F17.211 CIGARETTE NICOTINE DEPENDENCE IN REMISSION: ICD-10-CM

## 2025-07-07 DIAGNOSIS — Z87.891 PERSONAL HISTORY OF TOBACCO USE: ICD-10-CM

## 2025-07-07 PROCEDURE — 71271 CT THORAX LUNG CANCER SCR C-: CPT

## (undated) DEVICE — CONTAINER SPEC 20 ML LID NEUT BUFF FORMALIN 10 % POLYPR STS

## (undated) DEVICE — SNARE ENDOSCP M L240CM W27MM SHTH DIA2.4MM CHN 2.8MM OVL

## (undated) DEVICE — NEEDLE HYPO 18GA L1.5IN PNK S STL HUB POLYPR SHLD REG BVL

## (undated) DEVICE — SET ADMIN 16ML TBNG L100IN 2 Y INJ SITE IV PIGGY BK DISP

## (undated) DEVICE — 1200 GUARD II KIT W/5MM TUBE W/O VAC TUBE: Brand: GUARDIAN

## (undated) DEVICE — KIT,1200CC CANISTER,3/16"X6' TUBING: Brand: MEDLINE INDUSTRIES, INC.

## (undated) DEVICE — STRAINER URIN CALC RNL MSH -- CONVERT TO ITEM 357634

## (undated) DEVICE — TRAP ENDOSCP POLYP 2 CHMBR DRAWER TYP

## (undated) DEVICE — SINGLE-USE POLYPECTOMY SNARE: Brand: CAPTIVATOR

## (undated) DEVICE — CUFF BLD PRSS AD CLTH SGL TB W/ BAYNT CONN ROUNDED CORNER

## (undated) DEVICE — CATH IV AUTOGRD BC BLU 22GA 25 -- INSYTE

## (undated) DEVICE — ENDO CARRY-ON PROCEDURE KIT INCLUDES ENZYMATIC SPONGE, GAUZE, BIOHAZARD LABEL, TRAY, LUBRICANT, DIRTY SCOPE LABEL, WATER LABEL, TRAY, DRAWSTRING PAD, AND DEFENDO 4-PIECE KIT.: Brand: ENDO CARRY-ON PROCEDURE KIT

## (undated) DEVICE — SOLIDIFIER MEDC 1200ML -- CONVERT TO 356117

## (undated) DEVICE — Z DISCONTINUED PER MEDLINE LINE GAS SAMPLING O2/CO2 LNG AD 13 FT NSL W/ TBNG FILTERLINE

## (undated) DEVICE — Device

## (undated) DEVICE — KENDALL DL ECG CABLE AND LEAD WIRE SYSTEM, 3-LEAD, SINGLE PATIENT USE: Brand: KENDALL

## (undated) DEVICE — IV START KIT: Brand: MEDLINE

## (undated) DEVICE — KENDALL RADIOLUCENT FOAM MONITORING ELECTRODE RECTANGULAR SHAPE: Brand: KENDALL

## (undated) DEVICE — SOLUTION LACTATED RINGERS INJECTION USP

## (undated) DEVICE — TIP SUCT TRNSPAR RIB SURF STD BLB RIG NVENT W/ 5IN1 CONN DYND50138] MEDLINE INDUSTRIES INC]

## (undated) DEVICE — NEONATAL-ADULT SPO2 SENSOR: Brand: NELLCOR

## (undated) DEVICE — NON-REM POLYHESIVE PATIENT RETURN ELECTRODE: Brand: VALLEYLAB

## (undated) DEVICE — SYR 3ML LL TIP 1/10ML GRAD --

## (undated) DEVICE — CONTINU-FLO SOLUTION SET, 2 INJECTION SITES, MALE LUER LOCK ADAPTER WITH RETRACTABLE COLLAR, LARGE BORE STOPCOCK WITH ROTATING MALE LUER LOCK EXTENSION SET, 2 INJECTION SITES, MALE LUER LOCK ADAPTER WITH RETRACTABLE COLLAR: Brand: INTERLINK/CONTINU-FLO

## (undated) DEVICE — TRAP SUC MUCOUS 70ML -- MEDICHOICE MEDLINE

## (undated) DEVICE — BASIN EMSIS 16OZ GRAPHITE PLAS KID SHP MOLD GRAD FOR ORAL

## (undated) DEVICE — 3M™ TEGADERM™ TRANSPARENT FILM DRESSING FRAME STYLE, 1624W, 2-3/8 IN X 2-3/4 IN (6 CM X 7 CM), 100/CT 4CT/CASE: Brand: 3M™ TEGADERM™

## (undated) DEVICE — TOWEL 4 PLY TISS 19X30 SUE WHT

## (undated) DEVICE — SYRINGE 50ML E/T

## (undated) DEVICE — ELECTRODE PT RET AD L9FT HI MOIST COND ADH HYDRGEL CORDED

## (undated) DEVICE — SYR 10ML LUER LOK 1/5ML GRAD --

## (undated) DEVICE — CATH IV AUTOGRD BC PNK 20GA 25 -- INSYTE

## (undated) DEVICE — ENDOSCOPIC KIT COMPLIANCE ENDOKIT

## (undated) DEVICE — SYRINGE INFL 60ML DISP ALLIANCE II